# Patient Record
Sex: MALE | Race: WHITE | NOT HISPANIC OR LATINO | Employment: FULL TIME | URBAN - NONMETROPOLITAN AREA
[De-identification: names, ages, dates, MRNs, and addresses within clinical notes are randomized per-mention and may not be internally consistent; named-entity substitution may affect disease eponyms.]

---

## 2018-09-23 ENCOUNTER — OFFICE VISIT (OUTPATIENT)
Dept: URGENT CARE | Facility: PHYSICIAN GROUP | Age: 48
End: 2018-09-23
Payer: COMMERCIAL

## 2018-09-23 VITALS
RESPIRATION RATE: 16 BRPM | HEART RATE: 82 BPM | SYSTOLIC BLOOD PRESSURE: 130 MMHG | BODY MASS INDEX: 29.22 KG/M2 | TEMPERATURE: 98.9 F | DIASTOLIC BLOOD PRESSURE: 80 MMHG | OXYGEN SATURATION: 97 % | WEIGHT: 186.2 LBS | HEIGHT: 67 IN

## 2018-09-23 DIAGNOSIS — J01.90 ACUTE BACTERIAL SINUSITIS: ICD-10-CM

## 2018-09-23 DIAGNOSIS — B96.89 ACUTE BACTERIAL SINUSITIS: ICD-10-CM

## 2018-09-23 DIAGNOSIS — J02.9 SORETHROAT: ICD-10-CM

## 2018-09-23 LAB
INT CON NEG: NEGATIVE
INT CON POS: POSITIVE
S PYO AG THROAT QL: NEGATIVE

## 2018-09-23 PROCEDURE — 87880 STREP A ASSAY W/OPTIC: CPT | Performed by: PHYSICIAN ASSISTANT

## 2018-09-23 PROCEDURE — 99204 OFFICE O/P NEW MOD 45 MIN: CPT | Performed by: PHYSICIAN ASSISTANT

## 2018-09-23 RX ORDER — AMOXICILLIN AND CLAVULANATE POTASSIUM 875; 125 MG/1; MG/1
1 TABLET, FILM COATED ORAL 2 TIMES DAILY
Qty: 20 TAB | Refills: 0 | Status: SHIPPED | OUTPATIENT
Start: 2018-09-23 | End: 2018-10-03

## 2018-09-23 NOTE — PROGRESS NOTES
Chief Complaint   Patient presents with   • Pharyngitis     was treated strep 2 weeks ago       HISTORY OF PRESENT ILLNESS: Patient is a 48 y.o. male who presents today because he has a 1-2 week history of sore throat, also has some right-sided nasal and sinus pain, pressure, drainage and congestion.  He has put on a course of amoxicillin for strep throat a few weeks ago which did help with his symptoms initially but they returned shortly afterwards.  He has been using some over-the-counter medications without improvement    There are no active problems to display for this patient.      Allergies:Patient has no known allergies.    Current Outpatient Prescriptions Ordered in [facility]   Medication Sig Dispense Refill   • amoxicillin-clavulanate (AUGMENTIN) 875-125 MG Tab Take 1 Tab by mouth 2 times a day for 10 days. 20 Tab 0   • Testosterone (ANDROGEL TD) Apply  to skin as directed.       • oxycodone-acetaminophen (PERCOCET) 5-325 MG TABS Take 1-2 Tabs by mouth every 6 hours as needed for Mild Pain. 20 Each 0   • atorvastatin (LIPITOR) 20 MG TABS Take 1 Tab by mouth every bedtime. 90 Tab 0     No current Epic-ordered facility-administered medications on file.        No past medical history on file.    Social History   Substance Use Topics   • Smoking status: Never Smoker   • Smokeless tobacco: Former User   • Alcohol use Yes      Comment: occasional       Family Status   Relation Status   • Mo    • Fa      Family History   Problem Relation Age of Onset   • Heart Disease Mother        ROS:  Review of Systems   Constitutional: Negative for fever, chills, weight loss and malaise/fatigue.   HENT: Negative for ear pain, nosebleeds, positive for nasal and sinus congestion, sore throat and no neck pain.    Eyes: Negative for blurred vision.   Respiratory: Negative for cough, sputum production, shortness of breath and wheezing.    Cardiovascular: Negative for chest pain, palpitations, orthopnea and leg swelling.  "  Gastrointestinal: Negative for heartburn, nausea, vomiting and abdominal pain.   Genitourinary: Negative for dysuria, urgency and frequency.     Exam:  Blood pressure 130/80, pulse 82, temperature 37.2 °C (98.9 °F), temperature source Temporal, resp. rate 16, height 1.702 m (5' 7\"), weight 84.5 kg (186 lb 3.2 oz), SpO2 97 %.  General:  Well nourished, well developed male in NAD  Head:Normocephalic, atraumatic  Eyes: PERRLA, EOM within normal limits, no conjunctival injection, no scleral icterus, visual fields and acuity grossly intact.  Ears: Normal shape and symmetry, no tenderness, no discharge. External canals are without any significant edema or erythema. Tympanic membranes are without any inflammation, no effusion. Gross auditory acuity is intact  Nose: Symmetrical without tenderness, no discharge.  Nasal mucosa on the right is erythematous, excoriated and edematous, there is posterior nasal cavity exudate  Mouth: reasonable hygiene, no erythema exudates or tonsillar enlargement.  Neck: no masses, range of motion within normal limits, no tracheal deviation. No obvious thyroid enlargement.  Pulmonary: chest is symmetrical with respiration, no wheezes, crackles, or rhonchi.  Cardiovascular: regular rate and rhythm without murmurs, rubs, or gallops.  Extremities: no clubbing, cyanosis, or edema.    Please note that this dictation was created using voice recognition software. I have made every reasonable attempt to correct obvious errors, but I expect that there are errors of grammar and possibly content that I did not discover before finalizing the note.    Assessment/Plan:  1. Acute bacterial sinusitis  amoxicillin-clavulanate (AUGMENTIN) 875-125 MG Tab   2. Sorethroat  POCT Rapid Strep A   Tylenol or ibuprofen and decongestants as tolerated.    Followup with primary care in the next 7-10 days if not significantly improving, return to the urgent care or go to the emergency room sooner for any worsening of " symptoms.

## 2021-06-09 ENCOUNTER — OFFICE VISIT (OUTPATIENT)
Dept: URGENT CARE | Facility: PHYSICIAN GROUP | Age: 51
End: 2021-06-09
Payer: COMMERCIAL

## 2021-06-09 VITALS
TEMPERATURE: 97.7 F | OXYGEN SATURATION: 98 % | BODY MASS INDEX: 30.13 KG/M2 | DIASTOLIC BLOOD PRESSURE: 88 MMHG | WEIGHT: 192 LBS | SYSTOLIC BLOOD PRESSURE: 124 MMHG | RESPIRATION RATE: 12 BRPM | HEART RATE: 55 BPM | HEIGHT: 67 IN

## 2021-06-09 DIAGNOSIS — J32.9 RHINOSINUSITIS: ICD-10-CM

## 2021-06-09 DIAGNOSIS — H69.93 DYSFUNCTION OF BOTH EUSTACHIAN TUBES: ICD-10-CM

## 2021-06-09 PROCEDURE — 99214 OFFICE O/P EST MOD 30 MIN: CPT | Performed by: PHYSICIAN ASSISTANT

## 2021-06-09 RX ORDER — AMOXICILLIN AND CLAVULANATE POTASSIUM 875; 125 MG/1; MG/1
1 TABLET, FILM COATED ORAL 2 TIMES DAILY
Qty: 14 TABLET | Refills: 0 | Status: SHIPPED | OUTPATIENT
Start: 2021-06-09 | End: 2021-06-16

## 2021-06-09 RX ORDER — TRIAMCINOLONE ACETONIDE 40 MG/ML
40 INJECTION, SUSPENSION INTRA-ARTICULAR; INTRAMUSCULAR ONCE
Status: COMPLETED | OUTPATIENT
Start: 2021-06-09 | End: 2021-06-09

## 2021-06-09 RX ADMIN — TRIAMCINOLONE ACETONIDE 40 MG: 40 INJECTION, SUSPENSION INTRA-ARTICULAR; INTRAMUSCULAR at 09:28

## 2021-06-09 NOTE — PROGRESS NOTES
"Chief Complaint   Patient presents with   • Sinus Problem     headache and pressure, x2week    • Ear Pain     R ear pain        HISTORY OF PRESENT ILLNESS: Patient is a 50 y.o. male who presents today for the following:    Sinus pressure/HA x 2 weeks  No nasal drainage  Right ear pain  Denies cough, SOB, fever, body aches/chills  Uses NetiPot daily  Suffers from seasonal allergies    There are no problems to display for this patient.      Allergies:Patient has no known allergies.    Current Outpatient Medications Ordered in Epic   Medication Sig Dispense Refill   • amoxicillin-clavulanate (AUGMENTIN) 875-125 MG Tab Take 1 tablet by mouth 2 times a day for 7 days. 14 tablet 0     Current Facility-Administered Medications Ordered in Epic   Medication Dose Route Frequency Provider Last Rate Last Admin   • triamcinolone acetonide (KENALOG-40) injection 40 mg  40 mg Intramuscular Once DOLORES BriggsARONIT           History reviewed. No pertinent past medical history.    Social History     Tobacco Use   • Smoking status: Never Smoker   • Smokeless tobacco: Former User   Substance Use Topics   • Alcohol use: Yes     Comment: occasional   • Drug use: No       Family Status   Relation Name Status   • Mo     • Fa       Family History   Problem Relation Age of Onset   • Heart Disease Mother        Review of Systems:   Constitutional ROS: No unexpected change in weight  Pulmonary ROS: No chronic cough, sputum, or hemoptysis, No dyspnea on exertion, No wheezing  Cardiovascular ROS: No diaphoresis, No edema, No palpitations  Hematologic/Lymphatic ROS: No chills, No night sweats, No weight loss  Skin/Integumentary ROS: No edema, No evidence of rash, No itching      Exam:  /88   Pulse (!) 55   Temp 36.5 °C (97.7 °F) (Temporal)   Resp 12   Ht 1.702 m (5' 7\")   Wt 87.1 kg (192 lb)   SpO2 98%   General: Well developed, well nourished. No distress.    Eye: PERRL/EOMI; conjunctivae clear, lids " normal.  ENMT: Lips without lesions, MMM. Oropharynx is clear. Bilateral TMs are within normal limits but with serous effusions and bulging bilaterally.  Pulmonary: Unlabored respiratory effort. Lungs clear to auscultation, no wheezes, no rhonchi.    Cardiovascular: Regular rate and rhythm without murmur.   Neurologic: Grossly nonfocal. No facial asymmetry noted.  Lymph: No cervical lymphadenopathy noted.  Skin: Warm, dry, good turgor. No rashes in visible areas.   Psych: Normal mood. Alert and oriented to person, place and time.    Kenalog 40mg given in clinic    Assessment/Plan:  Discussed likely due to seasonal allergies.  Low suspicion for infection. Contingent antibiotic prescription given to patient to fill upon meeting criteria of guidelines discussed. Discussed appropriate over-the-counter symptomatic medication, and when to return to clinic. Follow up for worsening or persistent symptoms.  1. Rhinosinusitis  triamcinolone acetonide (KENALOG-40) injection 40 mg    amoxicillin-clavulanate (AUGMENTIN) 875-125 MG Tab   2. Dysfunction of both eustachian tubes

## 2021-06-16 ENCOUNTER — OFFICE VISIT (OUTPATIENT)
Dept: MEDICAL GROUP | Facility: PHYSICIAN GROUP | Age: 51
End: 2021-06-16
Payer: COMMERCIAL

## 2021-06-16 VITALS
DIASTOLIC BLOOD PRESSURE: 78 MMHG | SYSTOLIC BLOOD PRESSURE: 110 MMHG | HEIGHT: 67 IN | WEIGHT: 189 LBS | HEART RATE: 87 BPM | BODY MASS INDEX: 29.66 KG/M2 | OXYGEN SATURATION: 97 % | TEMPERATURE: 98.7 F

## 2021-06-16 DIAGNOSIS — Z76.89 ENCOUNTER TO ESTABLISH CARE: ICD-10-CM

## 2021-06-16 DIAGNOSIS — R53.83 FATIGUE, UNSPECIFIED TYPE: ICD-10-CM

## 2021-06-16 DIAGNOSIS — N40.0 ENLARGED PROSTATE: ICD-10-CM

## 2021-06-16 DIAGNOSIS — Z13.6 SCREENING FOR CARDIOVASCULAR CONDITION: ICD-10-CM

## 2021-06-16 DIAGNOSIS — J30.2 SEASONAL ALLERGIES: ICD-10-CM

## 2021-06-16 DIAGNOSIS — Z12.5 PROSTATE CANCER SCREENING: ICD-10-CM

## 2021-06-16 PROCEDURE — 99213 OFFICE O/P EST LOW 20 MIN: CPT | Performed by: NURSE PRACTITIONER

## 2021-06-16 ASSESSMENT — PATIENT HEALTH QUESTIONNAIRE - PHQ9: CLINICAL INTERPRETATION OF PHQ2 SCORE: 0

## 2021-06-16 NOTE — PROGRESS NOTES
Chief Complaint   Patient presents with   • Establish Care       Subjective:     HPI:   Alphonso Escobedo is a 50 y.o. male here to discuss the evaluation and management of:        Enlarged prostate  This is a chronic problem newly being assessed today.  Patient indicates that he has a history of enlarged prostate and does get up to pee at night approximately 4 times.  He denies any weakened stream.  He does have increased urination over the past 2 weeks.  Denies pain with urination, nausea, or back pain.    Fatigue  This is a chronic condition that is newly being assessed today.  Patient reports that his fatigue is worse in the afternoons and he does drink a lot of coffee to help with it.   He indicates that testosterone injections in the past have helped and is requesting testosterone levels be drawn today.  I do feel this is reasonable.  Labs have been ordered.      Seasonal allergies  This is a chronic conditions newly being assessed today.  Patient reports that he was recently seen in the urgent care for rhinosinusitis and seasonal allergy exacerbation.  He is currently taking Augmentin and did obtain a Kenalog shot during his urgent care appointment.  He is feeling better however he still states he is quite fatigued, has muscle achy pain in his chest, and neck.   He denies fever, headache, sinus pain, sore throat, ear pain, nausea, vomiting.  Patient did ask if he could have had Covid.  I educated him on the vague symptoms of Covid and that it is possible however testing at this point would not be appropriate as it has been 3 weeks since onset of symptoms.  I did encourage him to get the Covid vaccination.  Discussed good hand hygiene and to wear a mask in public due to not being vaccinated and slight possibility of jammie Covid.           ROS:  Gen: Positive per HPI for generalized muscle aches.  Eyes: no changes in vision  ENT: no sore throat, no bloody nose  Pulm: no sob, no cough  CV: no chest  "pain, no palpitations  GI: no nausea/vomiting, no diarrhea  : Positive per HPI for frequent nocturia  Skin: no rash  Neuro: no headaches,       No Known Allergies    Current medicines (including changes today)  Current Outpatient Medications   Medication Sig Dispense Refill   • amoxicillin-clavulanate (AUGMENTIN) 875-125 MG Tab Take 1 tablet by mouth 2 times a day for 7 days. 14 tablet 0     No current facility-administered medications for this visit.       Social History     Tobacco Use   • Smoking status: Never Smoker   • Smokeless tobacco: Former User   Substance Use Topics   • Alcohol use: Yes     Comment: occasional   • Drug use: No       Patient Active Problem List    Diagnosis Date Noted   • Fatigue 06/16/2021   • Enlarged prostate 06/16/2021   • Seasonal allergies 06/16/2021       Family History   Problem Relation Age of Onset   • Heart Disease Mother           Objective:     /78   Pulse 87   Temp 37.1 °C (98.7 °F)   Ht 1.702 m (5' 7\")   Wt 85.7 kg (189 lb)   SpO2 97%  Body mass index is 29.6 kg/m².    Physical Exam:  Constitutional: Well-developed and well-nourished male in NAD. Not diaphoretic. No distress.   Skin: warm, dry, intact, no evidence of rash or concerning lesions  Head: Atraumatic without lesions.  Eyes: Conjunctivae and extraocular motions are normal. Pupils are equal, round, and reactive to light. No scleral icterus.   Ears:  External ears unremarkable. TMs normal; bilaterally  Mouth/Throat: Tongue normal. Oropharynx is clear and moist. Posterior pharynx without erythema or exudates.  Neck: Supple, trachea midline. No thyromegaly present. No cervical or supraclavicular lymphadenopathy.  Cardiovascular: Regular rate and rhythm without murmur. Radial pulses are intact and equal bilaterally.  Pulmonary: Clear to ausculation. Normal effort. No rales, ronchi, or wheezing.  Abdomen: Soft, non tender, and without distention. Active bowel sounds in all four quadrants.   Extremities: No " cyanosis, clubbing, erythema, nor edema.   Neurological: Alert and oriented x 3. No cranial nerve deficit. 5/5 myotomes. Sensation intact.   Psychiatric:  Behavior, mood, and affect are appropriate.       Assessment and Plan:     The following treatment plan was discussed:    1. Encounter to establish care  Patient is here today to establish care with a new primary care provider.  He states his last primary care provider was a couple years ago.  He indicates that he is due for blood work.  He has not received the Covid vaccine.  Holds history of enlarged prostate, high cholesterol, and fatigue.    2. Screening for cardiovascular condition  - Comp Metabolic Panel; Future  - Lipid Profile; Future    3. Fatigue, unspecified type  - TESTOSTERONE SERUM; Future  - CBC WITH DIFFERENTIAL; Future    4. Prostate cancer screening  - PROSTATE SPECIFIC AG SCREENING; Future    5. Enlarged prostate  PSA level was ordered today.  Discussed possible referral to urology or form surgical management.    6. Seasonal allergies  Continue course of antibiotic treatment prescribed from urgent care.  Continue using over-the-counter antihistamines and Heather pot.  Educated to continue use mask as he has not Covid vaccinated.  We will continue to monitor at future appointments.    Any change or worsening of signs or symptoms, patient encouraged to follow-up or report to emergency room for further evaluation. Patient verbalizes understanding and agrees.    Follow-Up: Return in about 4 weeks (around 7/14/2021) for Follow up labs.      PLEASE NOTE: This dictation was created using voice recognition software. I have made every reasonable attempt to correct obvious errors, but I expect that there are errors of grammar and possibly content that I did not discover before finalizing the note.

## 2021-06-16 NOTE — PATIENT INSTRUCTIONS
Fatigue  If you have fatigue, you feel tired all the time and have a lack of energy or a lack of motivation. Fatigue may make it difficult to start or complete tasks because of exhaustion. In general, occasional or mild fatigue is often a normal response to activity or life. However, long-lasting (chronic) or extreme fatigue may be a symptom of a medical condition.  Follow these instructions at home:  General instructions  · Watch your fatigue for any changes.  · Go to bed and get up at the same time every day.  · Avoid fatigue by pacing yourself during the day and getting enough sleep at night.  · Maintain a healthy weight.  Medicines  · Take over-the-counter and prescription medicines only as told by your health care provider.  · Take a multivitamin, if told by your health care provider.   · Do not use herbal or dietary supplements unless they are approved by your health care provider.  Activity    · Exercise regularly, as told by your health care provider.  · Use or practice techniques to help you relax, such as yoga, chapo chi, meditation, or massage therapy.  Eating and drinking    · Avoid heavy meals in the evening.  · Eat a well-balanced diet, which includes lean proteins, whole grains, plenty of fruits and vegetables, and low-fat dairy products.  · Avoid consuming too much caffeine.  · Avoid the use of alcohol.  · Drink enough fluid to keep your urine pale yellow.  Lifestyle  · Change situations that cause you stress. Try to keep your work and personal schedule in balance.  · Do not use any products that contain nicotine or tobacco, such as cigarettes and e-cigarettes. If you need help quitting, ask your health care provider.  · Do not use drugs.  Contact a health care provider if:  · Your fatigue does not get better.  · You have a fever.  · You suddenly lose or gain weight.  · You have headaches.  · You have trouble falling asleep or sleeping through the night.  · You feel angry, guilty, anxious, or  sad.  · You are unable to have a bowel movement (constipation).  · Your skin is dry.  · You have swelling in your legs or another part of your body.  Get help right away if:  · You feel confused.  · Your vision is blurry.  · You feel faint or you pass out.  · You have a severe headache.  · You have severe pain in your abdomen, your back, or the area between your waist and hips (pelvis).  · You have chest pain, shortness of breath, or an irregular or fast heartbeat.  · You are unable to urinate, or you urinate less than normal.  · You have abnormal bleeding, such as bleeding from the rectum, vagina, nose, lungs, or nipples.  · You vomit blood.  · You have thoughts about hurting yourself or others.  If you ever feel like you may hurt yourself or others, or have thoughts about taking your own life, get help right away. You can go to your nearest emergency department or call:  · Your local emergency services (911 in the [place]  · A suicide crisis helpline, such as the National Suicide Prevention Lifeline at 1-252.805.2349. This is open 24 hours a day.  Summary  · If you have fatigue, you feel tired all the time and have a lack of energy or a lack of motivation.  · Fatigue may make it difficult to start or complete tasks because of exhaustion.  · Long-lasting (chronic) or extreme fatigue may be a symptom of a medical condition.  · Exercise regularly, as told by your health care provider.  · Change situations that cause you stress. Try to keep your work and personal schedule in balance.  This information is not intended to replace advice given to you by your health care provider. Make sure you discuss any questions you have with your health care provider.  Document Released: 10/14/2008 Document Revised: 04/09/2020 Document Reviewed: 09/12/2018  Elsevier Patient Education © 2020 [employer]

## 2021-06-16 NOTE — ASSESSMENT & PLAN NOTE
This is a chronic conditions newly being assessed today.  Patient reports that he was recently seen in the urgent care for rhinosinusitis and seasonal allergy exacerbation.  He is currently taking Augmentin and did obtain a Kenalog shot during his urgent care appointment.  He is feeling better however he still states he is quite fatigued, has muscle achy pain in his chest, and neck.   He denies fever, headache, sinus pain, sore throat, ear pain, nausea, vomiting.  Patient did ask if he could have had Covid.  I educated him on the vague symptoms of Covid and that it is possible however testing at this point would not be appropriate as it has been 3 weeks since onset of symptoms.  I did encourage him to get the Covid vaccination.  Discussed good hand hygiene and to wear a mask in public due to not being vaccinated and slight possibility of jammie Covid.

## 2021-06-16 NOTE — ASSESSMENT & PLAN NOTE
This is a chronic condition that is newly being assessed today.  Patient reports that his fatigue is worse in the afternoons and he does drink a lot of coffee to help with it.   He indicates that testosterone injections in the past have helped and is requesting testosterone levels be drawn today.  I do feel this is reasonable.  Labs have been ordered.

## 2021-06-16 NOTE — ASSESSMENT & PLAN NOTE
This is a chronic problem newly being assessed today.  Patient indicates that he has a history of enlarged prostate and does get up to pee at night approximately 4 times.  He denies any weakened stream.  He does have increased urination over the past 2 weeks.  Denies pain with urination, nausea, or back pain.

## 2021-06-24 ENCOUNTER — HOSPITAL ENCOUNTER (OUTPATIENT)
Dept: LAB | Facility: MEDICAL CENTER | Age: 51
End: 2021-06-24
Attending: NURSE PRACTITIONER
Payer: COMMERCIAL

## 2021-06-24 DIAGNOSIS — R53.83 FATIGUE, UNSPECIFIED TYPE: ICD-10-CM

## 2021-06-24 DIAGNOSIS — Z12.5 PROSTATE CANCER SCREENING: ICD-10-CM

## 2021-06-24 DIAGNOSIS — Z13.6 SCREENING FOR CARDIOVASCULAR CONDITION: ICD-10-CM

## 2021-06-24 PROCEDURE — 84403 ASSAY OF TOTAL TESTOSTERONE: CPT

## 2021-06-24 PROCEDURE — 80061 LIPID PANEL: CPT

## 2021-06-24 PROCEDURE — 84153 ASSAY OF PSA TOTAL: CPT

## 2021-06-24 PROCEDURE — 36415 COLL VENOUS BLD VENIPUNCTURE: CPT

## 2021-06-24 PROCEDURE — 85025 COMPLETE CBC W/AUTO DIFF WBC: CPT

## 2021-06-24 PROCEDURE — 80053 COMPREHEN METABOLIC PANEL: CPT

## 2021-06-25 LAB
ALBUMIN SERPL BCP-MCNC: 4.7 G/DL (ref 3.2–4.9)
ALBUMIN/GLOB SERPL: 1.9 G/DL
ALP SERPL-CCNC: 55 U/L (ref 30–99)
ALT SERPL-CCNC: 41 U/L (ref 2–50)
ANION GAP SERPL CALC-SCNC: 12 MMOL/L (ref 7–16)
AST SERPL-CCNC: 30 U/L (ref 12–45)
BASOPHILS # BLD AUTO: 0.4 % (ref 0–1.8)
BASOPHILS # BLD: 0.03 K/UL (ref 0–0.12)
BILIRUB SERPL-MCNC: 0.8 MG/DL (ref 0.1–1.5)
BUN SERPL-MCNC: 16 MG/DL (ref 8–22)
CALCIUM SERPL-MCNC: 9.5 MG/DL (ref 8.5–10.5)
CHLORIDE SERPL-SCNC: 107 MMOL/L (ref 96–112)
CHOLEST SERPL-MCNC: 270 MG/DL (ref 100–199)
CO2 SERPL-SCNC: 26 MMOL/L (ref 20–33)
CREAT SERPL-MCNC: 0.95 MG/DL (ref 0.5–1.4)
EOSINOPHIL # BLD AUTO: 0.09 K/UL (ref 0–0.51)
EOSINOPHIL NFR BLD: 1.2 % (ref 0–6.9)
ERYTHROCYTE [DISTWIDTH] IN BLOOD BY AUTOMATED COUNT: 43.4 FL (ref 35.9–50)
FASTING STATUS PATIENT QL REPORTED: NORMAL
GLOBULIN SER CALC-MCNC: 2.5 G/DL (ref 1.9–3.5)
GLUCOSE SERPL-MCNC: 94 MG/DL (ref 65–99)
HCT VFR BLD AUTO: 48.2 % (ref 42–52)
HDLC SERPL-MCNC: 37 MG/DL
HGB BLD-MCNC: 15.6 G/DL (ref 14–18)
IMM GRANULOCYTES # BLD AUTO: 0.03 K/UL (ref 0–0.11)
IMM GRANULOCYTES NFR BLD AUTO: 0.4 % (ref 0–0.9)
LDLC SERPL CALC-MCNC: 192 MG/DL
LYMPHOCYTES # BLD AUTO: 2.71 K/UL (ref 1–4.8)
LYMPHOCYTES NFR BLD: 35.3 % (ref 22–41)
MCH RBC QN AUTO: 31.1 PG (ref 27–33)
MCHC RBC AUTO-ENTMCNC: 32.4 G/DL (ref 33.7–35.3)
MCV RBC AUTO: 96.2 FL (ref 81.4–97.8)
MONOCYTES # BLD AUTO: 0.76 K/UL (ref 0–0.85)
MONOCYTES NFR BLD AUTO: 9.9 % (ref 0–13.4)
NEUTROPHILS # BLD AUTO: 4.06 K/UL (ref 1.82–7.42)
NEUTROPHILS NFR BLD: 52.8 % (ref 44–72)
NRBC # BLD AUTO: 0 K/UL
NRBC BLD-RTO: 0 /100 WBC
PLATELET # BLD AUTO: 200 K/UL (ref 164–446)
PMV BLD AUTO: 10.9 FL (ref 9–12.9)
POTASSIUM SERPL-SCNC: 5 MMOL/L (ref 3.6–5.5)
PROT SERPL-MCNC: 7.2 G/DL (ref 6–8.2)
PSA SERPL-MCNC: 0.98 NG/ML (ref 0–4)
RBC # BLD AUTO: 5.01 M/UL (ref 4.7–6.1)
SODIUM SERPL-SCNC: 145 MMOL/L (ref 135–145)
TESTOST SERPL-MCNC: 203 NG/DL (ref 175–781)
TRIGL SERPL-MCNC: 203 MG/DL (ref 0–149)
WBC # BLD AUTO: 7.7 K/UL (ref 4.8–10.8)

## 2021-07-13 ENCOUNTER — OFFICE VISIT (OUTPATIENT)
Dept: MEDICAL GROUP | Facility: PHYSICIAN GROUP | Age: 51
End: 2021-07-13
Payer: COMMERCIAL

## 2021-07-13 VITALS
HEIGHT: 67 IN | TEMPERATURE: 98.4 F | HEART RATE: 72 BPM | BODY MASS INDEX: 28.88 KG/M2 | SYSTOLIC BLOOD PRESSURE: 112 MMHG | WEIGHT: 184 LBS | OXYGEN SATURATION: 98 % | RESPIRATION RATE: 16 BRPM | DIASTOLIC BLOOD PRESSURE: 70 MMHG

## 2021-07-13 DIAGNOSIS — R53.83 FATIGUE, UNSPECIFIED TYPE: ICD-10-CM

## 2021-07-13 DIAGNOSIS — E78.2 MIXED DYSLIPIDEMIA: ICD-10-CM

## 2021-07-13 DIAGNOSIS — J30.2 SEASONAL ALLERGIES: ICD-10-CM

## 2021-07-13 PROCEDURE — 99214 OFFICE O/P EST MOD 30 MIN: CPT | Performed by: NURSE PRACTITIONER

## 2021-07-13 RX ORDER — ALBUTEROL SULFATE 90 UG/1
2 AEROSOL, METERED RESPIRATORY (INHALATION) EVERY 6 HOURS PRN
Qty: 8 G | Refills: 2 | Status: SHIPPED | OUTPATIENT
Start: 2021-07-13 | End: 2023-07-12 | Stop reason: SDUPTHER

## 2021-07-13 ASSESSMENT — FIBROSIS 4 INDEX: FIB4 SCORE: 1.17

## 2021-07-13 NOTE — PATIENT INSTRUCTIONS
"High Cholesterol    High cholesterol is a condition in which the blood has high levels of a white, waxy, fat-like substance (cholesterol). The human body needs small amounts of cholesterol. The liver makes all the cholesterol that the body needs. Extra (excess) cholesterol comes from the food that we eat.  Cholesterol is carried from the liver by the blood through the blood vessels. If you have high cholesterol, deposits (plaques) may build up on the walls of your blood vessels (arteries). Plaques make the arteries narrower and stiffer. Cholesterol plaques increase your risk for heart attack and stroke. Work with your health care provider to keep your cholesterol levels in a healthy range.  What increases the risk?  This condition is more likely to develop in people who:  · Eat foods that are high in animal fat (saturated fat) or cholesterol.  · Are overweight.  · Are not getting enough exercise.  · Have a family history of high cholesterol.  What are the signs or symptoms?  There are no symptoms of this condition.  How is this diagnosed?  This condition may be diagnosed from the results of a blood test.  · If you are older than age 20, your health care provider may check your cholesterol every 4-6 years.  · You may be checked more often if you already have high cholesterol or other risk factors for heart disease.  The blood test for cholesterol measures:  · \"Bad\" cholesterol (LDL cholesterol). This is the main type of cholesterol that causes heart disease. The desired level for LDL is less than 100.  · \"Good\" cholesterol (HDL cholesterol). This type helps to protect against heart disease by cleaning the arteries and carrying the LDL away. The desired level for HDL is 60 or higher.  · Triglycerides. These are fats that the body can store or burn for energy. The desired number for triglycerides is lower than 150.  · Total cholesterol. This is a measure of the total amount of cholesterol in your blood, including LDL " cholesterol, HDL cholesterol, and triglycerides. A healthy number is less than 200.  How is this treated?  This condition is treated with diet changes, lifestyle changes, and medicines.  Diet changes  · This may include eating more whole grains, fruits, vegetables, nuts, and fish.  · This may also include cutting back on red meat and foods that have a lot of added sugar.  Lifestyle changes  · Changes may include getting at least 40 minutes of aerobic exercise 3 times a week. Aerobic exercises include walking, biking, and swimming. Aerobic exercise along with a healthy diet can help you maintain a healthy weight.  · Changes may also include quitting smoking.  Medicines  · Medicines are usually given if diet and lifestyle changes have failed to reduce your cholesterol to healthy levels.  · Your health care provider may prescribe a statin medicine. Statin medicines have been shown to reduce cholesterol, which can reduce the risk of heart disease.  Follow these instructions at home:  Eating and drinking  If told by your health care provider:  · Eat chicken (without skin), fish, veal, shellfish, ground turkey breast, and round or loin cuts of red meat.  · Do not eat fried foods or fatty meats, such as hot dogs and salami.  · Eat plenty of fruits, such as apples.  · Eat plenty of vegetables, such as broccoli, potatoes, and carrots.  · Eat beans, peas, and lentils.  · Eat grains such as barley, rice, couscous, and bulgur wheat.  · Eat pasta without cream sauces.  · Use skim or nonfat milk, and eat low-fat or nonfat yogurt and cheeses.  · Do not eat or drink whole milk, cream, ice cream, egg yolks, or hard cheeses.  · Do not eat stick margarine or tub margarines that contain trans fats (also called partially hydrogenated oils).  · Do not eat saturated tropical oils, such as coconut oil and palm oil.  · Do not eat cakes, cookies, crackers, or other baked goods that contain trans fats.    General instructions  · Exercise as  directed by your health care provider. Increase your activity level with activities such as gardening, walking, and taking the stairs.  · Take over-the-counter and prescription medicines only as told by your health care provider.  · Do not use any products that contain nicotine or tobacco, such as cigarettes and e-cigarettes. If you need help quitting, ask your health care provider.  · Keep all follow-up visits as told by your health care provider. This is important.  Contact a health care provider if:  · You are struggling to maintain a healthy diet or weight.  · You need help to start on an exercise program.  · You need help to stop smoking.  Get help right away if:  · You have chest pain.  · You have trouble breathing.  This information is not intended to replace advice given to you by your health care provider. Make sure you discuss any questions you have with your health care provider.  Document Released: 12/18/2006 Document Revised: 12/21/2018 Document Reviewed: 06/17/2017  [employer] Patient Education © 2020 [employer]      Preventing High Cholesterol  Cholesterol is a white, waxy substance similar to fat that the human body needs to help build cells. The liver makes all the cholesterol that a person's body needs. Having high cholesterol (hypercholesterolemia) increases a person's risk for heart disease and stroke. Extra (excess) cholesterol comes from the food the person eats.  High cholesterol can often be prevented with diet and lifestyle changes. If you already have high cholesterol, you can control it with diet and lifestyle changes and with medicine.  How can high cholesterol affect me?  If you have high cholesterol, deposits (plaques) may build up on the walls of your arteries. The arteries are the blood vessels that carry blood away from your heart.  Plaques make the arteries narrower and stiffer. This can limit or block blood flow and cause blood clots to form. Blood clots:  · Are tiny balls of  cells that form in your blood.  · Can move to the heart or brain, causing a heart attack or stroke.  Plaques in arteries greatly increase your risk for heart attack and stroke.Making diet and lifestyle changes can reduce your risk for these conditions that may threaten your life.  What can increase my risk?  This condition is more likely to develop in people who:  · Eat foods that are high in saturated fat or cholesterol. Saturated fat is mostly found in:  ? Foods that contain animal fat, such as red meat and some dairy products.  ? Certain fatty foods made from plants, such as tropical oils.  · Are overweight.  · Are not getting enough exercise.  · Have a family history of high cholesterol.  What actions can I take to prevent this?  Nutrition    · Eat less saturated fat.  · Avoid trans fats (partially hydrogenated oils). These are often found in margarine and in some baked goods, fried foods, and snacks bought in packages.  · Avoid precooked or cured meat, such as sausages or meat loaves.  · Avoid foods and drinks that have added sugars.  · Eat more fruits, vegetables, and whole grains.  · Choose healthy sources of protein, such as fish, poultry, lean cuts of red meat, beans, peas, lentils, and nuts.  · Choose healthy sources of fat, such as:  ? Nuts.  ? Vegetable oils, especially olive oil.  ? Fish that have healthy fats (omega-3 fatty acids), such as mackerel or salmon.  The items listed above may not be a complete list of recommended foods and beverages. Contact a dietitian for more information.  Lifestyle  · Lose weight if you are overweight. Losing 5-10 lb (2.3-4.5 kg) can help prevent or control high cholesterol. It can also lower your risk for diabetes and high blood pressure. Ask your health care provider to help you with a diet and exercise plan to lose weight safely.  · Do not use any products that contain nicotine or tobacco, such as cigarettes, e-cigarettes, and chewing tobacco. If you need help  quitting, ask your health care provider.  · Limit your alcohol intake.  ? Do not drink alcohol if:  § Your health care provider tells you not to drink.  § You are pregnant, may be pregnant, or are planning to become pregnant.  ? If you drink alcohol:  § Limit how much you use to:  § 0-1 drink a day for women.  § 0-2 drinks a day for men.  § Be aware of how much alcohol is in your drink. In the [place] one drink equals one 12 oz bottle of beer (355 mL), one 5 oz glass of wine (148 mL), or one 1½ oz glass of hard liquor (44 mL).  Activity    · Get enough exercise. Each week, do at least 150 minutes of exercise that takes a medium level of effort (moderate-intensity exercise).  ? This is exercise that:  § Makes your heart beat faster and makes you breathe harder than usual.  § Allows you to still be able to talk.  ? You could exercise in short sessions several times a day or longer sessions a few times a week. For example, on 5 days each week, you could walk fast or ride your bike 3 times a day for 10 minutes each time.  · Do exercises as told by your health care provider.  Medicines  · In addition to diet and lifestyle changes, your health care provider may recommend medicines to help lower cholesterol. This may be a medicine to lower the amount of cholesterol your liver makes. You may need medicine if:  ? Diet and lifestyle changes do not lower your cholesterol enough.  ? You have high cholesterol and other risk factors for heart disease or stroke.  · Take over-the-counter and prescription medicines only as told by your health care provider.  General information  · Manage your risk factors for high cholesterol. Talk with your health care provider about all your risk factors and how to lower your risk.  · Manage other conditions that you have, such as diabetes or high blood pressure (hypertension).  · Have blood tests to check your cholesterol levels at regular points in time as told by your health care  provider.  · Keep all follow-up visits as told by your health care provider. This is important.  Where to find more information  · American Heart Association: www.heart.org  · National Heart, Lung, and Blood [place]: www.nhlbi.nih.gov  Summary  · High cholesterol increases your risk for heart disease and stroke. By keeping your cholesterol level low, you can reduce your risk for these conditions.  · High cholesterol can often be prevented with diet and lifestyle changes.  · Work with your health care provider to manage your risk factors, and have your blood tested regularly.  This information is not intended to replace advice given to you by your health care provider. Make sure you discuss any questions you have with your health care provider.  Document Released: 01/01/2017 Document Revised: 04/10/2020 Document Reviewed: 08/26/2017  [employer] Patient Education © 2020 [employer]

## 2021-07-13 NOTE — ASSESSMENT & PLAN NOTE
The 10-year ASCVD risk score (Leo TOBIN Jr., et al., 2013) is: 5.7%    Values used to calculate the score:      Age: 51 years      Sex: Male      Is Non- : No      Diabetic: No      Tobacco smoker: No      Systolic Blood Pressure: 112 mmHg      Is BP treated: No      HDL Cholesterol: 37 mg/dL      Total Cholesterol: 270 mg/dL    Reviewed labs with patient and dicussed diet and health lifestyle modification to improve his cholesterol levels.   He reports strong family history of elevated cholesterol that may be genetic related.   Dicussed referral to Lipoid clinic however he has declined at this time.
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
## 2022-03-08 ENCOUNTER — HOSPITAL ENCOUNTER (OUTPATIENT)
Dept: LAB | Facility: MEDICAL CENTER | Age: 52
End: 2022-03-08
Attending: NURSE PRACTITIONER
Payer: COMMERCIAL

## 2022-03-08 DIAGNOSIS — E78.2 MIXED DYSLIPIDEMIA: ICD-10-CM

## 2022-03-08 LAB
ALBUMIN SERPL BCP-MCNC: 4.7 G/DL (ref 3.2–4.9)
ALBUMIN/GLOB SERPL: 2 G/DL
ALP SERPL-CCNC: 71 U/L (ref 30–99)
ALT SERPL-CCNC: 59 U/L (ref 2–50)
ANION GAP SERPL CALC-SCNC: 11 MMOL/L (ref 7–16)
AST SERPL-CCNC: 34 U/L (ref 12–45)
BILIRUB SERPL-MCNC: 0.8 MG/DL (ref 0.1–1.5)
BUN SERPL-MCNC: 15 MG/DL (ref 8–22)
CALCIUM SERPL-MCNC: 9.9 MG/DL (ref 8.5–10.5)
CHLORIDE SERPL-SCNC: 103 MMOL/L (ref 96–112)
CHOLEST SERPL-MCNC: 266 MG/DL (ref 100–199)
CO2 SERPL-SCNC: 25 MMOL/L (ref 20–33)
CREAT SERPL-MCNC: 0.82 MG/DL (ref 0.5–1.4)
GLOBULIN SER CALC-MCNC: 2.3 G/DL (ref 1.9–3.5)
GLUCOSE SERPL-MCNC: 105 MG/DL (ref 65–99)
HDLC SERPL-MCNC: 30 MG/DL
LDLC SERPL CALC-MCNC: ABNORMAL MG/DL
POTASSIUM SERPL-SCNC: 4.4 MMOL/L (ref 3.6–5.5)
PROT SERPL-MCNC: 7 G/DL (ref 6–8.2)
SODIUM SERPL-SCNC: 139 MMOL/L (ref 135–145)
TRIGL SERPL-MCNC: 557 MG/DL (ref 0–149)

## 2022-03-08 PROCEDURE — 80053 COMPREHEN METABOLIC PANEL: CPT

## 2022-03-08 PROCEDURE — 36415 COLL VENOUS BLD VENIPUNCTURE: CPT

## 2022-03-08 PROCEDURE — 80061 LIPID PANEL: CPT

## 2022-03-16 ENCOUNTER — OFFICE VISIT (OUTPATIENT)
Dept: MEDICAL GROUP | Facility: PHYSICIAN GROUP | Age: 52
End: 2022-03-16
Payer: COMMERCIAL

## 2022-03-16 VITALS
WEIGHT: 190.8 LBS | SYSTOLIC BLOOD PRESSURE: 110 MMHG | DIASTOLIC BLOOD PRESSURE: 80 MMHG | RESPIRATION RATE: 16 BRPM | BODY MASS INDEX: 29.95 KG/M2 | OXYGEN SATURATION: 98 % | TEMPERATURE: 98.4 F | HEART RATE: 75 BPM | HEIGHT: 67 IN

## 2022-03-16 DIAGNOSIS — R73.01 IMPAIRED FASTING BLOOD SUGAR: ICD-10-CM

## 2022-03-16 DIAGNOSIS — R79.89 LOW TESTOSTERONE IN MALE: ICD-10-CM

## 2022-03-16 DIAGNOSIS — R74.01 ALT (SGPT) LEVEL RAISED: ICD-10-CM

## 2022-03-16 DIAGNOSIS — E78.2 MIXED DYSLIPIDEMIA: ICD-10-CM

## 2022-03-16 PROCEDURE — 99214 OFFICE O/P EST MOD 30 MIN: CPT | Performed by: NURSE PRACTITIONER

## 2022-03-16 ASSESSMENT — PATIENT HEALTH QUESTIONNAIRE - PHQ9: CLINICAL INTERPRETATION OF PHQ2 SCORE: 0

## 2022-03-16 ASSESSMENT — FIBROSIS 4 INDEX: FIB4 SCORE: 1.11

## 2022-03-17 NOTE — PROGRESS NOTES
Chief Complaint   Patient presents with   • Follow-Up     labs       Subjective:     HPI:   Alphonso Escobedo is a 51 y.o. male here to discuss the evaluation and management of:      Mixed dyslipidemia  The 10-year ASCVD risk score ([place] TOBIN Jr., et al., 2013) is: 6.8%    Values used to calculate the score:      Age: 51 years      Sex: Male      Is Non- : No      Diabetic: No      Tobacco smoker: No      Systolic Blood Pressure: 110 mmHg      Is BP treated: No      HDL Cholesterol: 30 mg/dL      Total Cholesterol: 266 mg/dL    Reviewed labs patient answered all questions.  Patient continues to have elevated total cholesterol, triglycerides, and normal HDL.  He does report that he went to [place] the weekend prior to his labs being drawn which could have affected his values.    He does not currently follow a healthy diet or regular exercise.    Plan  Discussed appropriate diet lifestyle modifications.  Hands were provided in clinic.  Encourage patient to start on omega-3's.  Patient declined wanting to start any prescription medication today.  Discussed risk associated with very elevated triglyceride levels.  Discussed possible referral to lipid clinic in the future.  We will repeat labs in 3 months.          ALT (SGPT) level raised  Reviewed labs with patient indicating mildly elevated ALT level.  Patient does indicate that the weekend prior to his lab draw he was in [place] and was drinking more than normal.  He denies any right upper quadrant pain, nausea, vomiting, or yellowing of the skin.    Plan  Discussed with patient increase alcohol use and encouraged him to not drink more than cc recommended guidelines of 1-2 alcoholic beverages per day.  He does indicate he does not normally drink however he was in [facility] and was drinking more than normal.  Repeat labs in 3 months.    Impaired fasting blood sugar  This is a new condition.  Reviewed labs with patient indicating mildly elevated  fasting blood sugar.  . Patient denies any symptoms of hyperglycemia.  He was drinking alcohol weekend prior to his lab draw.    Plan  Repeat labs in 3 months.  A1c levels added.  Discussed diet lifestyle modifications.    ROS:  Gen: no fevers/chills, no changes in weight  Eyes: no changes in vision  ENT: no sore throat, no hearing loss, no bloody nose  Pulm: no sob, no cough  CV: no chest pain, no palpitations  GI: no nausea/vomiting, no diarrhea  : no dysuria  MSk: no myalgias  Skin: no rash  Neuro: no headaches, no numbness/tingling  Heme/Lymph: no easy bruising    No Known Allergies    Current medicines (including changes today)  Current Outpatient Medications   Medication Sig Dispense Refill   • albuterol 108 (90 Base) MCG/ACT Aero Soln inhalation aerosol Inhale 2 Puffs every 6 hours as needed for Shortness of Breath. 8 g 2     No current facility-administered medications for this visit.          Objective:     Hospital Outpatient Visit on 03/08/2022   Component Date Value Ref Range Status   • Cholesterol,Tot 03/08/2022 266 (A) 100 - 199 mg/dL Final   • Triglycerides 03/08/2022 557 (A) 0 - 149 mg/dL Final   • HDL 03/08/2022 30 (A) >=40 mg/dL Final   • LDL 03/08/2022 see below  <100 mg/dL Final    Comment: The calculated LDL value is invalid due to the triglyceride  value of >400 mg/dL.     • Sodium 03/08/2022 139  135 - 145 mmol/L Final   • Potassium 03/08/2022 4.4  3.6 - 5.5 mmol/L Final   • Chloride 03/08/2022 103  96 - 112 mmol/L Final   • Co2 03/08/2022 25  20 - 33 mmol/L Final   • Anion Gap 03/08/2022 11.0  7.0 - 16.0 Final   • Glucose 03/08/2022 105 (A) 65 - 99 mg/dL Final   • Bun 03/08/2022 15  8 - 22 mg/dL Final   • Creatinine 03/08/2022 0.82  0.50 - 1.40 mg/dL Final   • Calcium 03/08/2022 9.9  8.5 - 10.5 mg/dL Final   • AST(SGOT) 03/08/2022 34  12 - 45 U/L Final   • ALT(SGPT) 03/08/2022 59 (A) 2 - 50 U/L Final   • Alkaline Phosphatase 03/08/2022 71  30 - 99 U/L Final   • Total Bilirubin 03/08/2022  "0.8  0.1 - 1.5 mg/dL Final   • Albumin 03/08/2022 4.7  3.2 - 4.9 g/dL Final   • Total Protein 03/08/2022 7.0  6.0 - 8.2 g/dL Final   • Globulin 03/08/2022 2.3  1.9 - 3.5 g/dL Final   • A-G Ratio 03/08/2022 2.0  g/dL Final   • GFR If  03/08/2022 >60  >60 mL/min/1.73 m 2 Final   • GFR If Non  03/08/2022 >60  >60 mL/min/1.73 m 2 Final    Comment: Reported eGFR is based the MDRD equation. Effective 3/15/22,  the laboratory will implement the new CKD-EPI 2021 equation.  In the interim, to calculate eGFR based on the CKD-EPI:2021  equation, go to [employer] website at  https://www.kidney.org/professionals/kdoqi/gfr_calculator/formula         /80   Pulse 75   Temp 36.9 °C (98.4 °F)   Resp 16   Ht 1.702 m (5' 7\")   Wt 86.5 kg (190 lb 12.8 oz)   SpO2 98%  Body mass index is 29.88 kg/m².    Physical Exam:  Constitutional: Well-developed and well-nourished male in NAD. Not diaphoretic. No distress.   Skin: warm, dry, intact, no evidence of rash or concerning lesions  Eyes: Conjunctivae and extraocular motions are normal. Pupils are equal, round, and reactive to light. No scleral icterus.   Cardiovascular: Regular rate and rhythm without murmur. Radial pulses are intact and equal bilaterally.  Pulmonary: Clear to ausculation. Normal effort. No rales, ronchi, or wheezing.  Abdomen: Soft, non tender, and without distention. Active bowel sounds in all four quadrants.   Extremities: No cyanosis, clubbing, erythema, nor edema.   Neurological: Alert and oriented x 3.   Psychiatric:  Behavior, mood, and affect are appropriate.    Assessment and Plan:     The following treatment plan was discussed:  I have reviewed all labs with patient and answered all questions.    1. Mixed dyslipidemia  - Lipid Profile; Future    2. ALT (SGPT) level raised  - Comp Metabolic Panel; Future    3. Low testosterone in male  - TESTOSTERONE SERUM; Future    4. Impaired fasting blood sugar  - " HEMOGLOBIN A1C; Future      Any change or worsening of signs or symptoms, patient encouraged to follow-up or report to emergency room for further evaluation. Patient verbalizes understanding and agrees.    Follow-Up: Return in about 4 months (around 7/16/2022) for Follow up labs.      PLEASE NOTE: This dictation was created using voice recognition software. I have made every reasonable attempt to correct obvious errors, but I expect that there are errors of grammar and possibly content that I did not discover before finalizing the note.

## 2022-03-17 NOTE — ASSESSMENT & PLAN NOTE
Reviewed labs with patient indicating mildly elevated ALT level.  Patient does indicate that the weekend prior to his lab draw he was in [place] and was drinking more than normal.  He denies any right upper quadrant pain, nausea, vomiting, or yellowing of the skin.    Plan  Discussed with patient increase alcohol use and encouraged him to not drink more than cc recommended guidelines of 1-2 alcoholic beverages per day.  He does indicate he does not normally drink however he was in [place] and was drinking more than normal.  Repeat labs in 3 months.

## 2022-03-17 NOTE — ASSESSMENT & PLAN NOTE
This is a new condition.  Reviewed labs with patient indicating mildly elevated fasting blood sugar.  . Patient denies any symptoms of hyperglycemia.  He was drinking alcohol weekend prior to his lab draw.    Plan  Repeat labs in 3 months.  A1c levels added.  Discussed diet lifestyle modifications.

## 2022-03-17 NOTE — PATIENT INSTRUCTIONS
"High Cholesterol    High cholesterol is a condition in which the blood has high levels of a white, waxy, fat-like substance (cholesterol). The human body needs small amounts of cholesterol. The liver makes all the cholesterol that the body needs. Extra (excess) cholesterol comes from the food that we eat.  Cholesterol is carried from the liver by the blood through the blood vessels. If you have high cholesterol, deposits (plaques) may build up on the walls of your blood vessels (arteries). Plaques make the arteries narrower and stiffer. Cholesterol plaques increase your risk for heart attack and stroke. Work with your health care provider to keep your cholesterol levels in a healthy range.  What increases the risk?  This condition is more likely to develop in people who:  · Eat foods that are high in animal fat (saturated fat) or cholesterol.  · Are overweight.  · Are not getting enough exercise.  · Have a family history of high cholesterol.  What are the signs or symptoms?  There are no symptoms of this condition.  How is this diagnosed?  This condition may be diagnosed from the results of a blood test.  · If you are older than age 20, your health care provider may check your cholesterol every 4-6 years.  · You may be checked more often if you already have high cholesterol or other risk factors for heart disease.  The blood test for cholesterol measures:  · \"Bad\" cholesterol (LDL cholesterol). This is the main type of cholesterol that causes heart disease. The desired level for LDL is less than 100.  · \"Good\" cholesterol (HDL cholesterol). This type helps to protect against heart disease by cleaning the arteries and carrying the LDL away. The desired level for HDL is 60 or higher.  · Triglycerides. These are fats that the body can store or burn for energy. The desired number for triglycerides is lower than 150.  · Total cholesterol. This is a measure of the total amount of cholesterol in your blood, including LDL " cholesterol, HDL cholesterol, and triglycerides. A healthy number is less than 200.  How is this treated?  This condition is treated with diet changes, lifestyle changes, and medicines.  Diet changes  · This may include eating more whole grains, fruits, vegetables, nuts, and fish.  · This may also include cutting back on red meat and foods that have a lot of added sugar.  Lifestyle changes  · Changes may include getting at least 40 minutes of aerobic exercise 3 times a week. Aerobic exercises include walking, biking, and swimming. Aerobic exercise along with a healthy diet can help you maintain a healthy weight.  · Changes may also include quitting smoking.  Medicines  · Medicines are usually given if diet and lifestyle changes have failed to reduce your cholesterol to healthy levels.  · Your health care provider may prescribe a statin medicine. Statin medicines have been shown to reduce cholesterol, which can reduce the risk of heart disease.  Follow these instructions at home:  Eating and drinking  If told by your health care provider:  · Eat chicken (without skin), fish, veal, shellfish, ground turkey breast, and round or loin cuts of red meat.  · Do not eat fried foods or fatty meats, such as hot dogs and salami.  · Eat plenty of fruits, such as apples.  · Eat plenty of vegetables, such as broccoli, potatoes, and carrots.  · Eat beans, peas, and lentils.  · Eat grains such as barley, rice, couscous, and bulgur wheat.  · Eat pasta without cream sauces.  · Use skim or nonfat milk, and eat low-fat or nonfat yogurt and cheeses.  · Do not eat or drink whole milk, cream, ice cream, egg yolks, or hard cheeses.  · Do not eat stick margarine or tub margarines that contain trans fats (also called partially hydrogenated oils).  · Do not eat saturated tropical oils, such as coconut oil and palm oil.  · Do not eat cakes, cookies, crackers, or other baked goods that contain trans fats.    General instructions  · Exercise as  directed by your health care provider. Increase your activity level with activities such as gardening, walking, and taking the stairs.  · Take over-the-counter and prescription medicines only as told by your health care provider.  · Do not use any products that contain nicotine or tobacco, such as cigarettes and e-cigarettes. If you need help quitting, ask your health care provider.  · Keep all follow-up visits as told by your health care provider. This is important.  Contact a health care provider if:  · You are struggling to maintain a healthy diet or weight.  · You need help to start on an exercise program.  · You need help to stop smoking.  Get help right away if:  · You have chest pain.  · You have trouble breathing.  This information is not intended to replace advice given to you by your health care provider. Make sure you discuss any questions you have with your health care provider.  Document Released: 12/18/2006 Document Revised: 12/21/2018 Document Reviewed: 06/17/2017  [employer] Patient Education © 2020 [employer]    Fish Oil, Omega-3 Fatty Acids capsules (Rx)  What is this medicine?  FISH OIL, OMEGA-3 FATTY ACIDS (Fish Oil, oh MAY ga 3 fatty AS ids) are essential fats. It is used to treat high triglyceride levels.  This medicine may be used for other purposes; ask your health care provider or pharmacist if you have questions.  COMMON BRAND NAME(S): Lovaza, Magna Floyd-3, [employer] Nutritionals Floyd-3 1450, [employer] Blue Omega, [place] Blue Professional Floyd-3 2100, [place], Omega-3, Ovega-3, Triklo  What should I tell my health care provider before I take this medicine?  They need to know if you have any of these conditions  · bleeding problems  · history of irregular heartbeat  · lung or breathing disease, like asthma  · an unusual or allergic reaction to fish oil, omega-3 fatty acids, fish, other medicines, foods, dyes, or preservatives  · pregnant or trying to get pregnant  · breast-feeding  How should  I use this medicine?  Take this medicine by mouth with a glass of water. Follow the directions on the prescription label. Lovaza and Epanova may be taken with or without food. Take Omtryg with food. Take your medicine at regular intervals. Do not take your medicine more often than directed.  Talk to your pediatrician regarding the use of this medicine in children. Special care may be needed.  Overdosage: If you think you have taken too much of this medicine contact a poison control center or emergency room at once.  NOTE: This medicine is only for you. Do not share this medicine with others.  What if I miss a dose?  If you miss a dose, take it as soon as you can. If it is almost time for your next dose, take only that dose. Do not take double or extra doses.  What may interact with this medicine?  · aspirin and aspirin-like medicines  · herbal products like danshen, dong quai, garlic pills, faiza, ginkgo biloba, horse chestnut, willow bark, and others  · medicines that treat or prevent blood clots like enoxaparin, heparin, warfarin  This list may not describe all possible interactions. Give your health care provider a list of all the medicines, herbs, non-prescription drugs, or dietary supplements you use. Also tell them if you smoke, drink alcohol, or use illegal drugs. Some items may interact with your medicine.  What should I watch for while using this medicine?  Follow a good diet and exercise plan. Taking this medicine does not replace a healthy lifestyle. Some foods that have omega-3 fatty acids naturally are fatty fish like albacore tuna, halibut, herring, mackerel, lake trout, salmon, and sardines.  If you are scheduled for any medical or dental procedure, tell your healthcare provider that you are taking this medicine. You may need to stop taking this medicine before the procedure.  What side effects may I notice from receiving this medicine?  Side effects that you should report to your doctor or health  care professional as soon as possible:  · allergic reactions like skin rash, itching or hives, swelling of the face, lips, or tongue  · breathing problems  · chest pain  · fever, infection  · unusual bleeding or bruising  Side effects that usually do not require medical attention (report to your doctor or health care professional if they continue or are bothersome):  · bad or fishy breath  · belching  · body odor  · diarrhea  · nausea  · stomach gas, upset  · weight gain  This list may not describe all possible side effects. Call your doctor for medical advice about side effects. You may report side effects to FDA at 6-649-MTS-2121.  Where should I keep my medicine?  Keep out of the reach of children.  Store at room temperature between 15 and 30 degrees C (59 and 86 degrees F). Do not freeze. Throw away any unused medicine after the expiration date.  NOTE: This sheet is a summary. It may not cover all possible information. If you have questions about this medicine, talk to your doctor, pharmacist, or health care provider.  © 2020 Elsevier/Gold Standard (2014-05-07 11:43:34)

## 2022-03-17 NOTE — ASSESSMENT & PLAN NOTE
The 10-year ASCVD risk score (Leo TOBIN Jr., et al., 2013) is: 6.8%    Values used to calculate the score:      Age: 51 years      Sex: Male      Is Non- : No      Diabetic: No      Tobacco smoker: No      Systolic Blood Pressure: 110 mmHg      Is BP treated: No      HDL Cholesterol: 30 mg/dL      Total Cholesterol: 266 mg/dL    Reviewed labs patient answered all questions.  Patient continues to have elevated total cholesterol, triglycerides, and normal HDL.  He does report that he went to [place] the weekend prior to his labs being drawn which could have affected his values.    He does not currently follow a healthy diet or regular exercise.    Plan  Discussed appropriate diet lifestyle modifications.  Hands were provided in clinic.  Encourage patient to start on omega-3's.  Patient declined wanting to start any prescription medication today.  Discussed risk associated with very elevated triglyceride levels.  Discussed possible referral to lipid clinic in the future.  We will repeat labs in 3 months.

## 2022-10-22 ENCOUNTER — OFFICE VISIT (OUTPATIENT)
Dept: URGENT CARE | Facility: PHYSICIAN GROUP | Age: 52
End: 2022-10-22
Payer: COMMERCIAL

## 2022-10-22 VITALS
TEMPERATURE: 97.4 F | RESPIRATION RATE: 18 BRPM | DIASTOLIC BLOOD PRESSURE: 60 MMHG | HEART RATE: 88 BPM | OXYGEN SATURATION: 96 % | HEIGHT: 67 IN | SYSTOLIC BLOOD PRESSURE: 104 MMHG | BODY MASS INDEX: 30.45 KG/M2 | WEIGHT: 194 LBS

## 2022-10-22 DIAGNOSIS — B96.89 ACUTE BACTERIAL SINUSITIS: ICD-10-CM

## 2022-10-22 DIAGNOSIS — R05.9 COUGH, UNSPECIFIED TYPE: ICD-10-CM

## 2022-10-22 DIAGNOSIS — J01.90 ACUTE BACTERIAL SINUSITIS: ICD-10-CM

## 2022-10-22 DIAGNOSIS — R07.89 DISCOMFORT IN CHEST: ICD-10-CM

## 2022-10-22 PROCEDURE — 99214 OFFICE O/P EST MOD 30 MIN: CPT | Performed by: NURSE PRACTITIONER

## 2022-10-22 PROCEDURE — 93000 ELECTROCARDIOGRAM COMPLETE: CPT | Performed by: NURSE PRACTITIONER

## 2022-10-22 RX ORDER — AMOXICILLIN AND CLAVULANATE POTASSIUM 875; 125 MG/1; MG/1
1 TABLET, FILM COATED ORAL 2 TIMES DAILY
Qty: 14 TABLET | Refills: 0 | Status: SHIPPED | OUTPATIENT
Start: 2022-10-22 | End: 2022-10-29

## 2022-10-22 RX ORDER — ALBUTEROL SULFATE 90 UG/1
2 AEROSOL, METERED RESPIRATORY (INHALATION) EVERY 6 HOURS PRN
Qty: 8.5 G | Refills: 0 | Status: SHIPPED | OUTPATIENT
Start: 2022-10-22 | End: 2023-07-12

## 2022-10-22 ASSESSMENT — ENCOUNTER SYMPTOMS
WHEEZING: 0
SHORTNESS OF BREATH: 0
MYALGIAS: 0
SINUS PAIN: 1
COUGH: 1
NAUSEA: 0
CHILLS: 0
HEADACHES: 1
DIZZINESS: 0
FEVER: 0
DIARRHEA: 0
SORE THROAT: 1

## 2022-10-22 ASSESSMENT — FIBROSIS 4 INDEX: FIB4 SCORE: 1.13

## 2022-10-22 NOTE — PROGRESS NOTES
Subjective     Alphonso Escobedo is a 52 y.o. male who presents with Pharyngitis (X 3 days off and on Uri x 3 wks. Sinus HA. Would like inhaler refill )            HPI  New problem.  Mr. Escobedo is a 52-year-old male who presents with a sore throat x3 days that has been intermittent.  He has also been experiencing nasal congestion and sinus headache for 3 weeks and is also requesting a refill on his albuterol inhaler.  He states that he has also been having bilateral chest pain in his pectoral muscles.  He denies nausea, vomiting, diarrhea, fever, chills.  He denies diaphoresis.  He does have a history of hyperlipidemia and impaired fasting glucose upon review of his chart.  He has been doing sinus rinses for his nasal congestion and taking his usual medications.    Patient has no known allergies.  Current Outpatient Medications on File Prior to Visit   Medication Sig Dispense Refill    albuterol 108 (90 Base) MCG/ACT Aero Soln inhalation aerosol Inhale 2 Puffs every 6 hours as needed for Shortness of Breath. 8 g 2     No current facility-administered medications on file prior to visit.     Social History     Socioeconomic History    Marital status:      Spouse name: Not on file    Number of children: Not on file    Years of education: Not on file    Highest education level: Not on file   Occupational History    Not on file   Tobacco Use    Smoking status: Never    Smokeless tobacco: Former     Types: Chew   Vaping Use    Vaping Use: Every day    Substances: Nicotine   Substance and Sexual Activity    Alcohol use: Yes     Comment: occasional    Drug use: No    Sexual activity: Not on file   Other Topics Concern    Not on file   Social History Narrative    Not on file     Social Determinants of Health     Financial Resource Strain: Not on file   Food Insecurity: Not on file   Transportation Needs: Not on file   Physical Activity: Not on file   Stress: Not on file   Social Connections: Not on file   Intimate Partner  "Violence: Not on file   Housing Stability: Not on file     Breast Cancer-related family history is not on file.      Review of Systems   Constitutional:  Negative for chills, fever and malaise/fatigue.   HENT:  Positive for congestion, sinus pain and sore throat.    Respiratory:  Positive for cough. Negative for shortness of breath and wheezing.    Cardiovascular:  Positive for chest pain.   Gastrointestinal:  Negative for diarrhea and nausea.   Musculoskeletal:  Negative for myalgias.   Neurological:  Positive for headaches. Negative for dizziness.            Objective     /60   Pulse 88   Temp 36.3 °C (97.4 °F) (Temporal)   Resp 18   Ht 1.702 m (5' 7\")   Wt 88 kg (194 lb)   SpO2 96%   BMI 30.38 kg/m²      Physical Exam  Constitutional:       General: He is not in acute distress.     Appearance: Normal appearance. He is well-developed. He is not ill-appearing.   HENT:      Head: Normocephalic.      Right Ear: Tympanic membrane and external ear normal.      Left Ear: Tympanic membrane and external ear normal.      Nose: Mucosal edema, congestion and rhinorrhea present.      Mouth/Throat:      Pharynx: No posterior oropharyngeal erythema.   Eyes:      General:         Right eye: No discharge.         Left eye: No discharge.      Conjunctiva/sclera: Conjunctivae normal.   Cardiovascular:      Rate and Rhythm: Normal rate and regular rhythm.      Heart sounds: Normal heart sounds.   Pulmonary:      Effort: Pulmonary effort is normal.      Breath sounds: Normal breath sounds. No wheezing or rales.   Musculoskeletal:         General: Normal range of motion.      Cervical back: Normal range of motion and neck supple.   Lymphadenopathy:      Cervical: No cervical adenopathy.   Skin:     General: Skin is warm and dry.   Neurological:      Mental Status: He is alert and oriented to person, place, and time.   Psychiatric:         Behavior: Behavior normal.         Thought Content: Thought content normal.        "                    Assessment & Plan        1. Acute bacterial sinusitis  amoxicillin-clavulanate (AUGMENTIN) 875-125 MG Tab      2. Cough, unspecified type  albuterol 108 (90 Base) MCG/ACT Aero Soln inhalation aerosol      3. Discomfort in chest  EKG - Clinic Performed        Patient placed on augmentin and his albuterol is refilled.  EKG with sinus rhythm; rate of 70. No acute abnormality noted.  Reviewed results with patient.  Differential diagnosis, natural history, supportive care, and indications for immediate follow-up discussed at length.

## 2022-12-14 DIAGNOSIS — R05.9 COUGH, UNSPECIFIED TYPE: ICD-10-CM

## 2023-03-01 ENCOUNTER — HOSPITAL ENCOUNTER (OUTPATIENT)
Dept: LAB | Facility: MEDICAL CENTER | Age: 53
End: 2023-03-01
Attending: NURSE PRACTITIONER
Payer: COMMERCIAL

## 2023-03-01 DIAGNOSIS — R79.89 LOW TESTOSTERONE IN MALE: ICD-10-CM

## 2023-03-01 DIAGNOSIS — R73.01 IMPAIRED FASTING BLOOD SUGAR: ICD-10-CM

## 2023-03-01 DIAGNOSIS — R74.01 ALT (SGPT) LEVEL RAISED: ICD-10-CM

## 2023-03-01 DIAGNOSIS — E78.2 MIXED DYSLIPIDEMIA: ICD-10-CM

## 2023-03-01 LAB
ALBUMIN SERPL BCP-MCNC: 4.7 G/DL (ref 3.2–4.9)
ALBUMIN/GLOB SERPL: 2 G/DL
ALP SERPL-CCNC: 69 U/L (ref 30–99)
ALT SERPL-CCNC: 45 U/L (ref 2–50)
ANION GAP SERPL CALC-SCNC: 10 MMOL/L (ref 7–16)
AST SERPL-CCNC: 26 U/L (ref 12–45)
BILIRUB SERPL-MCNC: 0.7 MG/DL (ref 0.1–1.5)
BUN SERPL-MCNC: 19 MG/DL (ref 8–22)
CALCIUM ALBUM COR SERPL-MCNC: 9.2 MG/DL (ref 8.5–10.5)
CALCIUM SERPL-MCNC: 9.8 MG/DL (ref 8.5–10.5)
CHLORIDE SERPL-SCNC: 107 MMOL/L (ref 96–112)
CHOLEST SERPL-MCNC: 223 MG/DL (ref 100–199)
CO2 SERPL-SCNC: 26 MMOL/L (ref 20–33)
CREAT SERPL-MCNC: 0.9 MG/DL (ref 0.5–1.4)
EST. AVERAGE GLUCOSE BLD GHB EST-MCNC: 108 MG/DL
FASTING STATUS PATIENT QL REPORTED: NORMAL
GFR SERPLBLD CREATININE-BSD FMLA CKD-EPI: 103 ML/MIN/1.73 M 2
GLOBULIN SER CALC-MCNC: 2.4 G/DL (ref 1.9–3.5)
GLUCOSE SERPL-MCNC: 106 MG/DL (ref 65–99)
HBA1C MFR BLD: 5.4 % (ref 4–5.6)
HDLC SERPL-MCNC: 26 MG/DL
LDLC SERPL CALC-MCNC: 124 MG/DL
POTASSIUM SERPL-SCNC: 4.8 MMOL/L (ref 3.6–5.5)
PROT SERPL-MCNC: 7.1 G/DL (ref 6–8.2)
SODIUM SERPL-SCNC: 143 MMOL/L (ref 135–145)
TESTOST SERPL-MCNC: 322 NG/DL (ref 175–781)
TRIGL SERPL-MCNC: 363 MG/DL (ref 0–149)

## 2023-03-01 PROCEDURE — 80053 COMPREHEN METABOLIC PANEL: CPT

## 2023-03-01 PROCEDURE — 83036 HEMOGLOBIN GLYCOSYLATED A1C: CPT

## 2023-03-01 PROCEDURE — 80061 LIPID PANEL: CPT

## 2023-03-01 PROCEDURE — 36415 COLL VENOUS BLD VENIPUNCTURE: CPT

## 2023-03-01 PROCEDURE — 84403 ASSAY OF TOTAL TESTOSTERONE: CPT

## 2023-03-14 RX ORDER — ALBUTEROL SULFATE 90 UG/1
AEROSOL, METERED RESPIRATORY (INHALATION)
Qty: 8.5 G | Refills: 0 | OUTPATIENT
Start: 2023-03-14

## 2023-04-03 ENCOUNTER — HOSPITAL ENCOUNTER (OUTPATIENT)
Facility: MEDICAL CENTER | Age: 53
End: 2023-04-03
Attending: PHYSICIAN ASSISTANT
Payer: COMMERCIAL

## 2023-04-03 ENCOUNTER — OFFICE VISIT (OUTPATIENT)
Dept: URGENT CARE | Facility: PHYSICIAN GROUP | Age: 53
End: 2023-04-03
Payer: COMMERCIAL

## 2023-04-03 VITALS
SYSTOLIC BLOOD PRESSURE: 110 MMHG | BODY MASS INDEX: 29.03 KG/M2 | OXYGEN SATURATION: 96 % | DIASTOLIC BLOOD PRESSURE: 70 MMHG | HEART RATE: 78 BPM | TEMPERATURE: 97.9 F | RESPIRATION RATE: 14 BRPM | WEIGHT: 185 LBS | HEIGHT: 67 IN

## 2023-04-03 DIAGNOSIS — N30.00 ACUTE CYSTITIS WITHOUT HEMATURIA: ICD-10-CM

## 2023-04-03 DIAGNOSIS — R30.0 DYSURIA: ICD-10-CM

## 2023-04-03 LAB
APPEARANCE UR: CLEAR
BILIRUB UR STRIP-MCNC: ABNORMAL MG/DL
COLOR UR AUTO: ABNORMAL
GLUCOSE UR STRIP.AUTO-MCNC: ABNORMAL MG/DL
KETONES UR STRIP.AUTO-MCNC: ABNORMAL MG/DL
LEUKOCYTE ESTERASE UR QL STRIP.AUTO: ABNORMAL
NITRITE UR QL STRIP.AUTO: ABNORMAL
PH UR STRIP.AUTO: 6 [PH] (ref 5–8)
PROT UR QL STRIP: ABNORMAL MG/DL
RBC UR QL AUTO: ABNORMAL
SP GR UR STRIP.AUTO: 1.02
UROBILINOGEN UR STRIP-MCNC: 0.2 MG/DL

## 2023-04-03 PROCEDURE — 81002 URINALYSIS NONAUTO W/O SCOPE: CPT | Performed by: PHYSICIAN ASSISTANT

## 2023-04-03 PROCEDURE — 87086 URINE CULTURE/COLONY COUNT: CPT

## 2023-04-03 PROCEDURE — 99214 OFFICE O/P EST MOD 30 MIN: CPT | Performed by: PHYSICIAN ASSISTANT

## 2023-04-03 PROCEDURE — 99000 SPECIMEN HANDLING OFFICE-LAB: CPT | Performed by: PHYSICIAN ASSISTANT

## 2023-04-03 RX ORDER — SULFAMETHOXAZOLE AND TRIMETHOPRIM 800; 160 MG/1; MG/1
1 TABLET ORAL EVERY 12 HOURS
Qty: 20 TABLET | Refills: 0 | Status: SHIPPED | OUTPATIENT
Start: 2023-04-03 | End: 2023-04-13

## 2023-04-03 ASSESSMENT — ENCOUNTER SYMPTOMS
CHILLS: 0
FLANK PAIN: 0
SWEATS: 0
NAUSEA: 0
VOMITING: 0

## 2023-04-03 ASSESSMENT — FIBROSIS 4 INDEX: FIB4 SCORE: 0.99

## 2023-04-05 LAB
BACTERIA UR CULT: NORMAL
SIGNIFICANT IND 70042: NORMAL
SITE SITE: NORMAL
SOURCE SOURCE: NORMAL

## 2023-04-06 NOTE — RESULT ENCOUNTER NOTE
Yossi Werner,    Your urine culture was negative.  If you feel your symptoms are improving with the antibiotic you may continue taking it.  I will also place referral to have you follow-up with urology.  Please be immediately reevaluated with any new or worsening symptoms.    RegardsARMIDA

## 2023-07-12 ENCOUNTER — OFFICE VISIT (OUTPATIENT)
Dept: MEDICAL GROUP | Facility: PHYSICIAN GROUP | Age: 53
End: 2023-07-12
Payer: COMMERCIAL

## 2023-07-12 VITALS
OXYGEN SATURATION: 97 % | DIASTOLIC BLOOD PRESSURE: 78 MMHG | TEMPERATURE: 98.9 F | BODY MASS INDEX: 29.98 KG/M2 | HEART RATE: 72 BPM | SYSTOLIC BLOOD PRESSURE: 126 MMHG | HEIGHT: 67 IN | WEIGHT: 191 LBS

## 2023-07-12 DIAGNOSIS — J30.2 SEASONAL ALLERGIES: ICD-10-CM

## 2023-07-12 DIAGNOSIS — E78.2 MIXED DYSLIPIDEMIA: ICD-10-CM

## 2023-07-12 DIAGNOSIS — R73.01 IMPAIRED FASTING BLOOD SUGAR: ICD-10-CM

## 2023-07-12 DIAGNOSIS — B07.0 PLANTAR WART OF LEFT FOOT: ICD-10-CM

## 2023-07-12 DIAGNOSIS — Z12.11 COLON CANCER SCREENING: ICD-10-CM

## 2023-07-12 DIAGNOSIS — N40.0 ENLARGED PROSTATE: ICD-10-CM

## 2023-07-12 DIAGNOSIS — Z76.89 ENCOUNTER TO ESTABLISH CARE: ICD-10-CM

## 2023-07-12 PROBLEM — J45.909 REACTIVE AIRWAY DISEASE: Status: ACTIVE | Noted: 2023-07-12

## 2023-07-12 PROBLEM — J45.909 REACTIVE AIRWAY DISEASE: Status: RESOLVED | Noted: 2023-07-12 | Resolved: 2023-07-12

## 2023-07-12 PROBLEM — R74.01 ALT (SGPT) LEVEL RAISED: Status: RESOLVED | Noted: 2022-03-16 | Resolved: 2023-07-12

## 2023-07-12 PROCEDURE — 3074F SYST BP LT 130 MM HG: CPT | Performed by: STUDENT IN AN ORGANIZED HEALTH CARE EDUCATION/TRAINING PROGRAM

## 2023-07-12 PROCEDURE — 99214 OFFICE O/P EST MOD 30 MIN: CPT | Performed by: STUDENT IN AN ORGANIZED HEALTH CARE EDUCATION/TRAINING PROGRAM

## 2023-07-12 PROCEDURE — 3078F DIAST BP <80 MM HG: CPT | Performed by: STUDENT IN AN ORGANIZED HEALTH CARE EDUCATION/TRAINING PROGRAM

## 2023-07-12 RX ORDER — ALBUTEROL SULFATE 90 UG/1
2 AEROSOL, METERED RESPIRATORY (INHALATION) EVERY 6 HOURS PRN
Qty: 8 G | Refills: 1 | Status: SHIPPED | OUTPATIENT
Start: 2023-07-12

## 2023-07-12 RX ORDER — TAMSULOSIN HYDROCHLORIDE 0.4 MG/1
0.4 CAPSULE ORAL
COMMUNITY
Start: 2023-07-09

## 2023-07-12 RX ORDER — SULFAMETHOXAZOLE AND TRIMETHOPRIM 800; 160 MG/1; MG/1
TABLET ORAL
COMMUNITY
End: 2023-07-12

## 2023-07-12 RX ORDER — TRIAMCINOLONE ACETONIDE 40 MG/ML
40 INJECTION, SUSPENSION INTRA-ARTICULAR; INTRAMUSCULAR ONCE
Status: COMPLETED | OUTPATIENT
Start: 2023-07-12 | End: 2023-07-12

## 2023-07-12 RX ORDER — AMOXICILLIN AND CLAVULANATE POTASSIUM 875; 125 MG/1; MG/1
1 TABLET, FILM COATED ORAL 2 TIMES DAILY
COMMUNITY
End: 2023-07-12

## 2023-07-12 RX ADMIN — TRIAMCINOLONE ACETONIDE 40 MG: 40 INJECTION, SUSPENSION INTRA-ARTICULAR; INTRAMUSCULAR at 11:42

## 2023-07-12 ASSESSMENT — PATIENT HEALTH QUESTIONNAIRE - PHQ9: CLINICAL INTERPRETATION OF PHQ2 SCORE: 0
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
## 2023-07-13 NOTE — ASSESSMENT & PLAN NOTE
Patient reports allergies have not been well controlled despite using OTC allergy medication, flonase, and neti pot.  Patient has never seen an Allergist.  Patient received kenalog injection in 2021.  Side effects of kenalog were discussed and patient is requesting an injection today.

## 2023-09-12 ENCOUNTER — HOSPITAL ENCOUNTER (OUTPATIENT)
Dept: LAB | Facility: MEDICAL CENTER | Age: 53
End: 2023-09-12
Attending: PHYSICIAN ASSISTANT
Payer: COMMERCIAL

## 2023-09-12 LAB — PSA SERPL-MCNC: 1.58 NG/ML (ref 0–4)

## 2023-09-12 PROCEDURE — 84153 ASSAY OF PSA TOTAL: CPT

## 2023-09-12 PROCEDURE — 36415 COLL VENOUS BLD VENIPUNCTURE: CPT

## 2023-11-23 ENCOUNTER — OFFICE VISIT (OUTPATIENT)
Dept: URGENT CARE | Facility: PHYSICIAN GROUP | Age: 53
End: 2023-11-23
Payer: COMMERCIAL

## 2023-11-23 VITALS
SYSTOLIC BLOOD PRESSURE: 100 MMHG | OXYGEN SATURATION: 100 % | HEIGHT: 67 IN | RESPIRATION RATE: 18 BRPM | DIASTOLIC BLOOD PRESSURE: 62 MMHG | TEMPERATURE: 97.5 F | BODY MASS INDEX: 29.51 KG/M2 | WEIGHT: 188 LBS | HEART RATE: 62 BPM

## 2023-11-23 DIAGNOSIS — H61.21 CERUMINOSIS, RIGHT: ICD-10-CM

## 2023-11-23 DIAGNOSIS — S00.411A ABRASION OF RIGHT EAR CANAL, INITIAL ENCOUNTER: ICD-10-CM

## 2023-11-23 PROCEDURE — 3074F SYST BP LT 130 MM HG: CPT | Performed by: FAMILY MEDICINE

## 2023-11-23 PROCEDURE — 3078F DIAST BP <80 MM HG: CPT | Performed by: FAMILY MEDICINE

## 2023-11-23 PROCEDURE — 99213 OFFICE O/P EST LOW 20 MIN: CPT | Performed by: FAMILY MEDICINE

## 2023-11-23 RX ORDER — TAMSULOSIN HYDROCHLORIDE 0.4 MG/1
1 CAPSULE ORAL
COMMUNITY
End: 2023-11-23

## 2023-11-23 RX ORDER — NEOMYCIN SULFATE, POLYMYXIN B SULFATE, HYDROCORTISONE 3.5; 10000; 1 MG/ML; [USP'U]/ML; MG/ML
SOLUTION/ DROPS AURICULAR (OTIC)
Qty: 10 ML | Refills: 0 | Status: SHIPPED | OUTPATIENT
Start: 2023-11-23
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
## 2024-06-17 DIAGNOSIS — Z12.5 PROSTATE CANCER SCREENING: ICD-10-CM

## 2024-06-17 DIAGNOSIS — R73.01 IMPAIRED FASTING BLOOD SUGAR: ICD-10-CM

## 2024-06-17 DIAGNOSIS — Z00.00 ROUTINE HEALTH MAINTENANCE: ICD-10-CM

## 2024-06-17 DIAGNOSIS — E78.2 MIXED DYSLIPIDEMIA: ICD-10-CM

## 2024-06-18 DIAGNOSIS — R79.89 LOW TESTOSTERONE: ICD-10-CM

## 2024-07-12 ENCOUNTER — APPOINTMENT (OUTPATIENT)
Dept: MEDICAL GROUP | Facility: PHYSICIAN GROUP | Age: 54
End: 2024-07-12
Payer: COMMERCIAL

## 2024-07-30 ENCOUNTER — HOSPITAL ENCOUNTER (OUTPATIENT)
Dept: LAB | Facility: MEDICAL CENTER | Age: 54
End: 2024-07-30
Attending: STUDENT IN AN ORGANIZED HEALTH CARE EDUCATION/TRAINING PROGRAM
Payer: COMMERCIAL

## 2024-07-30 DIAGNOSIS — R79.89 LOW TESTOSTERONE: ICD-10-CM

## 2024-07-30 DIAGNOSIS — E78.2 MIXED DYSLIPIDEMIA: ICD-10-CM

## 2024-07-30 DIAGNOSIS — R73.01 IMPAIRED FASTING BLOOD SUGAR: ICD-10-CM

## 2024-07-30 DIAGNOSIS — Z00.00 ROUTINE HEALTH MAINTENANCE: ICD-10-CM

## 2024-07-30 DIAGNOSIS — Z12.5 PROSTATE CANCER SCREENING: ICD-10-CM

## 2024-07-30 LAB
ALBUMIN SERPL BCP-MCNC: 4.5 G/DL (ref 3.2–4.9)
ALBUMIN/GLOB SERPL: 1.7 G/DL
ALP SERPL-CCNC: 64 U/L (ref 30–99)
ALT SERPL-CCNC: 32 U/L (ref 2–50)
ANION GAP SERPL CALC-SCNC: 11 MMOL/L (ref 7–16)
AST SERPL-CCNC: 23 U/L (ref 12–45)
BILIRUB SERPL-MCNC: 0.5 MG/DL (ref 0.1–1.5)
BUN SERPL-MCNC: 21 MG/DL (ref 8–22)
CALCIUM ALBUM COR SERPL-MCNC: 9.3 MG/DL (ref 8.5–10.5)
CALCIUM SERPL-MCNC: 9.7 MG/DL (ref 8.5–10.5)
CHLORIDE SERPL-SCNC: 106 MMOL/L (ref 96–112)
CHOLEST SERPL-MCNC: 239 MG/DL (ref 100–199)
CO2 SERPL-SCNC: 23 MMOL/L (ref 20–33)
CREAT SERPL-MCNC: 1.04 MG/DL (ref 0.5–1.4)
ERYTHROCYTE [DISTWIDTH] IN BLOOD BY AUTOMATED COUNT: 41.1 FL (ref 35.9–50)
FASTING STATUS PATIENT QL REPORTED: NORMAL
GFR SERPLBLD CREATININE-BSD FMLA CKD-EPI: 86 ML/MIN/1.73 M 2
GLOBULIN SER CALC-MCNC: 2.6 G/DL (ref 1.9–3.5)
GLUCOSE SERPL-MCNC: 105 MG/DL (ref 65–99)
HCT VFR BLD AUTO: 45.1 % (ref 42–52)
HDLC SERPL-MCNC: 29 MG/DL
HGB BLD-MCNC: 15.3 G/DL (ref 14–18)
LDLC SERPL CALC-MCNC: ABNORMAL MG/DL
MCH RBC QN AUTO: 31 PG (ref 27–33)
MCHC RBC AUTO-ENTMCNC: 33.9 G/DL (ref 32.3–36.5)
MCV RBC AUTO: 91.5 FL (ref 81.4–97.8)
PLATELET # BLD AUTO: 205 K/UL (ref 164–446)
PMV BLD AUTO: 11.2 FL (ref 9–12.9)
POTASSIUM SERPL-SCNC: 4.7 MMOL/L (ref 3.6–5.5)
PROT SERPL-MCNC: 7.1 G/DL (ref 6–8.2)
PSA SERPL-MCNC: 1.56 NG/ML (ref 0–4)
RBC # BLD AUTO: 4.93 M/UL (ref 4.7–6.1)
SODIUM SERPL-SCNC: 140 MMOL/L (ref 135–145)
TRIGL SERPL-MCNC: 496 MG/DL (ref 0–149)
WBC # BLD AUTO: 6.4 K/UL (ref 4.8–10.8)

## 2024-07-30 PROCEDURE — 36415 COLL VENOUS BLD VENIPUNCTURE: CPT

## 2024-07-30 PROCEDURE — 84153 ASSAY OF PSA TOTAL: CPT

## 2024-07-30 PROCEDURE — 84403 ASSAY OF TOTAL TESTOSTERONE: CPT

## 2024-07-30 PROCEDURE — 80061 LIPID PANEL: CPT

## 2024-07-30 PROCEDURE — 80053 COMPREHEN METABOLIC PANEL: CPT

## 2024-07-30 PROCEDURE — 85027 COMPLETE CBC AUTOMATED: CPT

## 2024-07-30 PROCEDURE — 84402 ASSAY OF FREE TESTOSTERONE: CPT

## 2024-07-30 PROCEDURE — 84270 ASSAY OF SEX HORMONE GLOBUL: CPT

## 2024-08-01 LAB
SHBG SERPL-SCNC: 34 NMOL/L (ref 19–76)
TESTOST FREE MFR SERPL: 1.8 % (ref 1.6–2.9)
TESTOST FREE SERPL-MCNC: 55 PG/ML (ref 47–244)
TESTOST SERPL-MCNC: 308 NG/DL (ref 300–890)

## 2024-08-05 ENCOUNTER — RESEARCH ENCOUNTER (OUTPATIENT)
Dept: RESEARCH | Facility: MEDICAL CENTER | Age: 54
End: 2024-08-05

## 2024-08-29 ENCOUNTER — APPOINTMENT (OUTPATIENT)
Dept: MEDICAL GROUP | Facility: PHYSICIAN GROUP | Age: 54
End: 2024-08-29
Payer: COMMERCIAL

## 2024-08-29 VITALS
WEIGHT: 182.4 LBS | HEIGHT: 67 IN | OXYGEN SATURATION: 97 % | SYSTOLIC BLOOD PRESSURE: 110 MMHG | HEART RATE: 85 BPM | TEMPERATURE: 98.4 F | DIASTOLIC BLOOD PRESSURE: 62 MMHG | BODY MASS INDEX: 28.63 KG/M2

## 2024-08-29 DIAGNOSIS — R79.89 LOW TESTOSTERONE: ICD-10-CM

## 2024-08-29 DIAGNOSIS — N40.0 ENLARGED PROSTATE: ICD-10-CM

## 2024-08-29 DIAGNOSIS — H61.23 BILATERAL IMPACTED CERUMEN: ICD-10-CM

## 2024-08-29 DIAGNOSIS — Z76.89 ENCOUNTER TO ESTABLISH CARE: ICD-10-CM

## 2024-08-29 DIAGNOSIS — E78.2 MIXED DYSLIPIDEMIA: ICD-10-CM

## 2024-08-29 DIAGNOSIS — J30.2 SEASONAL ALLERGIES: ICD-10-CM

## 2024-08-29 RX ORDER — FENOFIBRATE 145 MG/1
145 TABLET, COATED ORAL DAILY
Qty: 90 TABLET | Refills: 3 | Status: SHIPPED | OUTPATIENT
Start: 2024-08-29

## 2024-08-29 RX ORDER — ALBUTEROL SULFATE 90 UG/1
2 AEROSOL, METERED RESPIRATORY (INHALATION) EVERY 6 HOURS PRN
Qty: 8 G | Refills: 2 | Status: SHIPPED | OUTPATIENT
Start: 2024-08-29

## 2024-08-29 ASSESSMENT — PATIENT HEALTH QUESTIONNAIRE - PHQ9: CLINICAL INTERPRETATION OF PHQ2 SCORE: 0

## 2024-08-29 ASSESSMENT — FIBROSIS 4 INDEX: FIB4 SCORE: 1.05

## 2024-08-29 NOTE — ASSESSMENT & PLAN NOTE
July 2024 testosterone 308.  Patient reports she was on androgel and injections in the past but insurance stopped covering them.  Currently using OTC nugenix total T.

## 2024-08-29 NOTE — PROGRESS NOTES
Subjective:     CC:   Chief Complaint   Patient presents with    Lab Results    Medication Refill       HPI:   Alphonso Escobedo is a 54 y.o. male who presents for an annual exam. He is feeling well and has no complaints.    Enlarged prostate  Follows up with Urology.  Patient stopped flomax due to side effects and is currently using OTC supplement with selenium.  July 2024 PSA 1.56.    Low testosterone  July 2024 testosterone 308.  Patient reports she was on androgel and injections in the past but insurance stopped covering them.  Currently using OTC nugenix total T.        Health Maintenance  Cholesterol Screening: July 2024 , , HDL 29, LDL not calculated  Diabetes Screening: July 2024   Aspirin Use: N/A   Diet / Execise: BMI 28.57  Smoking: denies  Substance Abuse: denies  Safe in relationship: yes  Dentist: follows up regularly  Ophthalmology: follows up regularly    Cancer screening  Colorectal Cancer Screening: colonoscopy Dec 2023, repeat 3  yrs  Prostate Cancer Screening/PSA: July 2024 PSA WNL    Immunizations  Tetanus: reports received a few years ago  Shingles: advised to get at pharmacy      He  has no past medical history of ASTHMA, Diabetes, or IBD (inflammatory bowel disease).  He  has a past surgical history that includes umbilical hernia repair (09/25/2008) and craniotomy (2003).  Family History   Problem Relation Age of Onset    Heart Disease Mother         pacemaker    No Known Problems Sister     Cancer Maternal Uncle         prostate    Other Paternal Uncle         hepatitis    Heart Disease Maternal Grandmother     Cancer Paternal Grandmother     Cancer Other         prostate cancer in cousins and dad's uncle    Ovarian Cancer Neg Hx     Tubal Cancer Neg Hx     Peritoneal Cancer Neg Hx     Colorectal Cancer Neg Hx     Breast Cancer Neg Hx      Social History     Tobacco Use    Smoking status: Never    Smokeless tobacco: Former     Types: Chew    Tobacco comments:     Chewed  "age 13 to 41   Vaping Use    Vaping status: Every Day    Substances: Nicotine   Substance Use Topics    Alcohol use: Yes     Comment: occasional    Drug use: No       Patient Active Problem List    Diagnosis Date Noted    Low testosterone 06/18/2024    Impaired fasting blood sugar 03/16/2022    Mixed dyslipidemia 07/13/2021    Fatigue 06/16/2021    Enlarged prostate 06/16/2021    Seasonal allergies 06/16/2021       Current Outpatient Medications   Medication Sig Dispense Refill    fenofibrate (TRICOR) 145 MG Tab Take 1 Tablet by mouth every day. 90 Tablet 3    albuterol 108 (90 Base) MCG/ACT Aero Soln inhalation aerosol Inhale 2 Puffs every 6 hours as needed for Shortness of Breath. 8 g 2    NON SPECIFIED Vitamin d  Nugenix total t for low testosterone  Otc supplementat with selenium for BPH  Otc supplement with citrus bergamot for cholesterol       No current facility-administered medications for this visit.    (including changes today)  Allergies: Patient has no known allergies.    Review of Systems   Constitutional: Negative for fever, chills and malaise/fatigue.   HENT: Negative for congestion.    Eyes: Negative for pain.   Respiratory: Negative for cough and shortness of breath.    Cardiovascular: Negative for leg swelling.   Gastrointestinal: Negative for nausea, vomiting, abdominal pain and diarrhea.   Genitourinary: Negative for dysuria and hematuria.   Skin: Negative for rash.   Neurological: Negative for dizziness, focal weakness and headaches.   Endo/Heme/Allergies: Does not bleed easily.   Psychiatric/Behavioral: Negative for depression.  The patient is not nervous/anxious.      Objective:     /62 (BP Location: Right arm, Patient Position: Sitting, BP Cuff Size: Adult)   Pulse 85   Temp 36.9 °C (98.4 °F) (Temporal)   Ht 1.702 m (5' 7\")   Wt 82.7 kg (182 lb 6.4 oz)   SpO2 97%   BMI 28.57 kg/m²   Body mass index is 28.57 kg/m².  Wt Readings from Last 4 Encounters:   08/29/24 82.7 kg (182 lb 6.4 " oz)   11/23/23 85.3 kg (188 lb)   07/12/23 86.6 kg (191 lb)   04/03/23 83.9 kg (185 lb)       Physical Exam:  Constitutional: Well-developed and well-nourished. No acute distress.   Skin: Skin is warm and dry. No rash noted.  Head: Atraumatic without lesions.  Eyes: Conjunctivae and extraocular motions are normal. Pupils are equal, round, and reactive to light. No scleral icterus.   Ears:  External ears unremarkable.  Cerumen impaction bilaterally and unable to visualize tympanic membranes.  Mouth/Throat: Dentition is good. Tongue normal. Oropharynx is clear and moist. Posterior pharynx without erythema or exudates.  Neck: Supple, trachea midline. Normal range of motion. No thyromegaly. No lymphadenopathy.  Cardiovascular: Regular rate and rhythm, S1 and S2 without murmur, rubs, or gallops.    Lungs: Effort normal. Clear to auscultation bilaterally.  Abdomen: Soft, non tender, and without distention. Active bowel sounds.  Extremities: No cyanosis, clubbing, erythema, nor edema.  Musculoskeletal: Normal ROM in all extremities.  Neurological: Alert and oriented x 3.  No acute focal deficits.  Psychiatric:  Behavior, mood, and affect are appropriate.    Labs:  Hospital Outpatient Visit on 07/30/2024   Component Date Value Ref Range Status    Testosterone,Total 07/30/2024 308  300 - 890 ng/dL Final    Comment: INTERPRETIVE INFORMATION: Testosterone by Immunoassay  Testosterone immunoassays are both imprecise and inaccurate at low  testosterone concentrations, such as those found in children and  cisgender females. For these individuals, testing by mass  spectrometry is recommended; refer to Testosterone (Adult Females,  Children, or Individuals on Testosterone-Suppressing Hormone  Therapy) ([facility] test code 9698276). Free or bioavailable  testosterone measurements may provide supportive information.  For individuals on testosterone hormone therapy, refer to  cisgender male reference intervals. No reference intervals  have  been established for males younger than 14 years or for cisgender  females. For a complete set of all established reference  intervals, refer to [facility]/Tests/Pub/[id number].      Sex Hormone Bind Globulin 07/30/2024 34  19 - 76 nmol/L Final    Comment: REFERENCE INTERVAL: Sex Hormone Binding Globulin  Access complete set of age- and/or gender-specific reference  intervals for this test in the [facility] Test Directory  ([email or web address]).      Free Testosterone 07/30/2024 55  47 - 244 pg/mL Final    Comment: INTERPRETIVE INFORMATION:  Testosterone, Free Calculation  Free testosterone concentration is calculated using total  testosterone (measured by immunoassay) and the binding constant of  testosterone and sex hormone-binding globulin (SHBG). Testosterone  immunoassays are both imprecise and inaccurate at low testosterone  concentrations, such as those found in children and cisgender  females. For these individuals, testing by mass spectrometry is  recommended; refer to Testosterone, Free (Adult Females, Children,  or Individuals on Testosterone-Suppressing Hormone Therapy) ([facility]  test code 0961630).  For individuals on testosterone hormone therapy, refer to  cisgender male reference intervals. No reference intervals have  been established for males younger than 14 years or for cisgender  females. For a complete set of all established reference  intervals, refer to [facility]/Tests/Pub/[id number].      Testosterone % Free 07/30/2024 1.8  1.6 - 2.9 % Final    Comment: Performed By: [facility]  [address] [place] [id number]  : Lester Wiley MD, PhD  CLIA Number: [id number]      Prostatic Specific Antigen Tot 07/30/2024 1.56  0.00 - 4.00 ng/mL Final    Comment: Performed using Roche sanjeev e immunoassay analyzer. tPSA values determined  on patient samples by different testing procedures cannot be directly  compared with one another and could be the cause of  erroneous medical  interpretations. If there is a change in the tPSA assay procedure used while  monitoring therapy, then new baselines may need to be established when  comparing previous results.      Cholesterol,Tot 07/30/2024 239 (H)  100 - 199 mg/dL Final    Triglycerides 07/30/2024 496 (H)  0 - 149 mg/dL Final    HDL 07/30/2024 29 (A)  >=40 mg/dL Final    LDL 07/30/2024 see below  <100 mg/dL Final    Comment: The calculated LDL value is invalid due to the triglyceride  value of >400 mg/dL.      Sodium 07/30/2024 140  135 - 145 mmol/L Final    Potassium 07/30/2024 4.7  3.6 - 5.5 mmol/L Final    Chloride 07/30/2024 106  96 - 112 mmol/L Final    Co2 07/30/2024 23  20 - 33 mmol/L Final    Anion Gap 07/30/2024 11.0  7.0 - 16.0 Final    Glucose 07/30/2024 105 (H)  65 - 99 mg/dL Final    Bun 07/30/2024 21  8 - 22 mg/dL Final    Creatinine 07/30/2024 1.04  0.50 - 1.40 mg/dL Final    Calcium 07/30/2024 9.7  8.5 - 10.5 mg/dL Final    Correct Calcium 07/30/2024 9.3  8.5 - 10.5 mg/dL Final    AST(SGOT) 07/30/2024 23  12 - 45 U/L Final    ALT(SGPT) 07/30/2024 32  2 - 50 U/L Final    Alkaline Phosphatase 07/30/2024 64  30 - 99 U/L Final    Total Bilirubin 07/30/2024 0.5  0.1 - 1.5 mg/dL Final    Albumin 07/30/2024 4.5  3.2 - 4.9 g/dL Final    Total Protein 07/30/2024 7.1  6.0 - 8.2 g/dL Final    Globulin 07/30/2024 2.6  1.9 - 3.5 g/dL Final    A-G Ratio 07/30/2024 1.7  g/dL Final    WBC 07/30/2024 6.4  4.8 - 10.8 K/uL Final    RBC 07/30/2024 4.93  4.70 - 6.10 M/uL Final    Hemoglobin 07/30/2024 15.3  14.0 - 18.0 g/dL Final    Hematocrit 07/30/2024 45.1  42.0 - 52.0 % Final    MCV 07/30/2024 91.5  81.4 - 97.8 fL Final    MCH 07/30/2024 31.0  27.0 - 33.0 pg Final    MCHC 07/30/2024 33.9  32.3 - 36.5 g/dL Final    RDW 07/30/2024 41.1  35.9 - 50.0 fL Final    Platelet Count 07/30/2024 205  164 - 446 K/uL Final    MPV 07/30/2024 11.2  9.0 - 12.9 fL Final    Fasting Status 07/30/2024 Fasting   Final    GFR (CKD-EPI) 07/30/2024 86   >60 mL/min/1.73 m 2 Final    Comment: Estimated Glomerular Filtration Rate is calculated using  race neutral CKD-EPI 2021 equation per [facility]-ASN recommendations.           Assessment and Plan:     1. Encounter to establish care  Annual preventive exam done today.  Labs were reviewed with the patient.  UTD with prostate and colon cancer screening.    2. Mixed dyslipidemia  Chronic, uncontrolled.  , , HDL 29, LDL not calculated.  Prescribed fenofibrate 145 mg daily and given referral to lipid clinic for further management.  - fenofibrate (TRICOR) 145 MG Tab; Take 1 Tablet by mouth every day.  Dispense: 90 Tablet; Refill: 3  - Referral to Lipid Clinic    3. Bilateral impacted cerumen  Chronic, uncontrolled.  Advised to use earwax softener and avoid Q-tips.  Patient will return for ear lavage.    4. Seasonal allergies  Chronic, stable.  Continue albuterol as needed.  - albuterol 108 (90 Base) MCG/ACT Aero Soln inhalation aerosol; Inhale 2 Puffs every 6 hours as needed for Shortness of Breath.  Dispense: 8 g; Refill: 2    5. Enlarged prostate  Chronic, stable.  Follows up with urology.  PSA WNL.  Patient could not tolerate Flomax and is using OTC supplement.    6. Low testosterone  Chronic, stable.  Follows up with urology.  Testosterone WNL.  Currently using OTC supplement due to insurance not covering testosterone replacement.      Anticipatory guidance  --Discussed moderation in sodium/caffeine intake, saturated fat and cholesterol, caloric balance, sufficient fresh fruits/vegetables, fiber.  --Discussed brushing, flossing, and dental visits.   --Encouraged 150 minutes of exercise weekly.   --Discussed tobacco, alcohol, and other drug use.  --Discussed safety belts, safety helmets, smoke detector, gun safety etc.  --Discussed sun protection with minimum of spf 30.      Follow-up: Return in about 1 week (around 9/5/2024) for ear lavage.

## 2024-08-29 NOTE — ASSESSMENT & PLAN NOTE
Follows up with Urology.  Patient stopped flomax due to side effects and is currently using OTC supplement with selenium.  July 2024 PSA 1.56.

## 2024-09-11 ENCOUNTER — TELEPHONE (OUTPATIENT)
Dept: VASCULAR LAB | Facility: MEDICAL CENTER | Age: 54
End: 2024-09-11
Payer: COMMERCIAL

## 2024-09-11 NOTE — TELEPHONE ENCOUNTER
Spoke to patient in regarding NP appointment.    Any recent testing (labs or imaging) outside of [facility]?  No    Were any records requested?  No    New patient packet sent via [facility] or mail?  Yes    Referral attached to appointment (renewals and New patients only)? Yes    Is appointment virtual? No

## 2024-09-19 ENCOUNTER — OFFICE VISIT (OUTPATIENT)
Dept: VASCULAR LAB | Facility: MEDICAL CENTER | Age: 54
End: 2024-09-19
Attending: FAMILY MEDICINE
Payer: COMMERCIAL

## 2024-09-19 VITALS
SYSTOLIC BLOOD PRESSURE: 106 MMHG | HEIGHT: 67 IN | WEIGHT: 175 LBS | BODY MASS INDEX: 27.47 KG/M2 | HEART RATE: 63 BPM | DIASTOLIC BLOOD PRESSURE: 66 MMHG

## 2024-09-19 DIAGNOSIS — Z91.89 OTHER SPECIFIED PERSONAL RISK FACTORS, NOT ELSEWHERE CLASSIFIED: ICD-10-CM

## 2024-09-19 DIAGNOSIS — E88.810 METABOLIC SYNDROME: ICD-10-CM

## 2024-09-19 DIAGNOSIS — E78.1 HYPERTRIGLYCERIDEMIA: ICD-10-CM

## 2024-09-19 DIAGNOSIS — E66.3 OVERWEIGHT (BMI 25.0-29.9): ICD-10-CM

## 2024-09-19 DIAGNOSIS — R73.03 PREDIABETES: ICD-10-CM

## 2024-09-19 DIAGNOSIS — E78.49 FCHL (FAMILIAL COMBINED HYPERLIPIDEMIA): ICD-10-CM

## 2024-09-19 PROCEDURE — 99212 OFFICE O/P EST SF 10 MIN: CPT

## 2024-09-19 PROCEDURE — 3074F SYST BP LT 130 MM HG: CPT | Performed by: FAMILY MEDICINE

## 2024-09-19 PROCEDURE — 3078F DIAST BP <80 MM HG: CPT | Performed by: FAMILY MEDICINE

## 2024-09-19 PROCEDURE — 99204 OFFICE O/P NEW MOD 45 MIN: CPT | Performed by: FAMILY MEDICINE

## 2024-09-19 RX ORDER — ROSUVASTATIN CALCIUM 10 MG/1
10 TABLET, COATED ORAL DAILY
Qty: 100 TABLET | Refills: 3 | Status: SHIPPED | OUTPATIENT
Start: 2024-09-19

## 2024-09-19 ASSESSMENT — ENCOUNTER SYMPTOMS
PND: 0
SPUTUM PRODUCTION: 0
ORTHOPNEA: 0
FEVER: 0
COUGH: 0
SHORTNESS OF BREATH: 0
WHEEZING: 0
HEMOPTYSIS: 0
CLAUDICATION: 0
CHILLS: 0
PALPITATIONS: 0

## 2024-09-19 ASSESSMENT — FIBROSIS 4 INDEX: FIB4 SCORE: 1.05

## 2024-09-19 NOTE — PROGRESS NOTES
INITIAL FAMILY LIPID CLINIC VISIT   09/19/24     Alphonso Escobedo has been referred for evaluation and mgmt of dyslipidemia  Referral Source: Leelee Jaffe M.D.   Est 9/2024    Subjective    Initial visit history: seen by PCP 8/29/24 and noted to have persistent uncontrolled mixed HLD.  Started on fenofibrate by PCP.  Reports no prior acute pancreatitis or other ascvd.  No major etoh intake.      PERTINENT HLD PMHX  Age at Initial Diagnosis of Dyslipidemia: 20s    Baseline Lipids Prior to Treatment:    Latest Reference Range & Units 03/08/22 06:42   Cholesterol,Tot 100 - 199 mg/dL 266 (H)   Triglycerides 0 - 149 mg/dL 557 (H)   HDL >=40 mg/dL 30 !   LDL <100 mg/dL see below      Latest Reference Range & Units 06/24/21 06:35 03/08/22 06:42 03/01/23 06:20 07/30/24 06:08   LDL <100 mg/dL 192 (H) see below 124 (H) see below     History of ASCVD: None   Other Established (non-atherosclerotic) Vascular Disease: None  Secondary contributors/causes of dyslipidemia:  Endocrine/Hypothyroidism: BMI 27,  MetS= insulin resistance   Liver disease: none reported   Renal disease/nephrotic syndrome:  none reported  Dietary-induced (ketogenic, lean mass hyper-responder)? no  Medications: None  Previously Attempted Interventions for Lipids - including outcome  Statin: lipitor     Outcome: no side effects   Non-Statin: niacin  Outcome: suboptimal response and not applicable    CURRENT MED MGMT  Current Lipid Lowering Meds:   Statin: None  Non-Statin: fenofibrate 145mg daily   Supplements: None  Current adverse drug reactions/side effects? no  Adherence? Yes     LIFESTYLE MGMT  Change in weight:  reduced 13-15lb over 6+ months   Exercise habits: minimal exercise  Dietary patterns: initiating healthy choices   Etoh: see sochx  Barriers to care/SDOH: none  Tobacco:  reports that he has never smoked. He has quit using smokeless tobacco.  His smokeless tobacco use included chew.     OTHER CV RISK FACTORS:  HTN: no prior dx or meds    Dysglycemia: yes, preDM  Antithrombotic tx: no       Patient Active Problem List   Diagnosis    Fatigue    Enlarged prostate    Seasonal allergies    Mixed dyslipidemia    Impaired fasting blood sugar    Low testosterone      has a past surgical history that includes umbilical hernia repair (2008) and craniotomy ().  Current Outpatient Medications on File Prior to Visit   Medication Sig Dispense Refill    fenofibrate (TRICOR) 145 MG Tab Take 1 Tablet by mouth every day. 90 Tablet 3    albuterol 108 (90 Base) MCG/ACT Aero Soln inhalation aerosol Inhale 2 Puffs every 6 hours as needed for Shortness of Breath. 8 g 2    NON SPECIFIED Vitamin d  Nugenix total t for low testosterone  Otc supplementat with selenium for BPH  Otc supplement with citrus bergamot for cholesterol       No current facility-administered medications on file prior to visit.      No Known Allergies   Family History   Problem Relation Age of Onset    Heart Disease Mother 54        pacemaker,     Hyperlipidemia Mother     Valvular heart disease Mother     Other Father 27        suicide    Hyperlipidemia Sister     Cancer Maternal Uncle         prostate    Other Paternal Uncle         hepatitis    Heart Disease Maternal Grandmother     Heart Attack Maternal Grandmother 45        angioplasty    Lung Cancer Maternal Grandmother 83        tobacco    Cancer Paternal Grandmother     Hyperlipidemia Daughter     Cancer Other         prostate cancer in cousins and dad's uncle    Ovarian Cancer Neg Hx     Tubal Cancer Neg Hx     Peritoneal Cancer Neg Hx     Colorectal Cancer Neg Hx     Breast Cancer Neg Hx      Social History     Tobacco Use    Smoking status: Never    Smokeless tobacco: Former     Types: Chew    Tobacco comments:     Chewed age 13 to 41   Vaping Use    Vaping status: Every Day    Substances: Nicotine   Substance Use Topics    Alcohol use: Yes     Comment: occasional    Drug use: No     Review of Systems   Constitutional:   "Negative for chills, fever and malaise/fatigue.   Respiratory:  Negative for cough, hemoptysis, sputum production, shortness of breath and wheezing.    Cardiovascular:  Negative for chest pain, palpitations, orthopnea, claudication, leg swelling and PND.         Objective    Vitals:    09/19/24 1411   BP: 106/66   BP Location: Left arm   Patient Position: Sitting   BP Cuff Size: Adult   Pulse: 63   Weight: 79.4 kg (175 lb)   Height: 1.702 m (5' 7\")     BP Readings from Last 5 Encounters:   09/19/24 106/66   08/29/24 110/62   11/23/23 100/62   07/12/23 126/78   04/03/23 110/70     BMI Readings from Last 1 Encounters:   09/19/24 27.41 kg/m²     Wt Readings from Last 3 Encounters:   09/19/24 79.4 kg (175 lb)   08/29/24 82.7 kg (182 lb 6.4 oz)   11/23/23 85.3 kg (188 lb)     Physical Exam  Constitutional:       General: He is not in acute distress.     Appearance: Normal appearance. He is not ill-appearing.   HENT:      Head: Normocephalic and atraumatic.   Eyes:      Conjunctiva/sclera: Conjunctivae normal.      Comments: No corneal arcus   Neck:      Vascular: No carotid bruit.   Cardiovascular:      Rate and Rhythm: Normal rate and regular rhythm.      Pulses:           Carotid pulses are 2+ on the right side and 2+ on the left side.       Radial pulses are 2+ on the right side and 2+ on the left side.        Popliteal pulses are 2+ on the right side and 2+ on the left side.        Dorsalis pedis pulses are 2+ on the right side and 2+ on the left side.      Heart sounds: No murmur heard.  Pulmonary:      Effort: Pulmonary effort is normal.      Breath sounds: Normal breath sounds.   Musculoskeletal:      Cervical back: Normal range of motion.      Right lower leg: No edema.      Left lower leg: No edema.   Feet:      Comments: No achilles thickening or nodularity  Skin:     General: Skin is warm and dry.      Coloration: Skin is not cyanotic or mottled.      Findings: No wound.      Comments: No tendon xanthomas " "  Neurological:      General: No focal deficit present.      Mental Status: He is alert.   Psychiatric:         Mood and Affect: Mood normal.       DATA REVIEW:  Most Recent Lipid Panel:   Lab Results   Component Value Date/Time    CHOLSTRLTOT 239 (H) 2024 06:08 AM    LDL see below 2024 06:08 AM    HDL 29 (A) 2024 06:08 AM    TRIGLYCERIDE 496 (H) 2024 06:08 AM     Lab Results   Component Value Date/Time    LDL see below 2024 06:08 AM     (H) 2023 06:20 AM    LDL see below 2022 06:42 AM     (H) 2021 06:35 AM      No results found for: \"LIPOPROTA\"   No results found for: \"APOB\"   No results found for: \"CRPHIGHSEN\"    Other Pertinent Blood Work:   Lab Results   Component Value Date    SODIUM 140 2024    POTASSIUM 4.7 2024    CHLORIDE 106 2024    CO2 23 2024    ANION 11.0 2024    GLUCOSE 105 (H) 2024    BUN 21 2024    CREATININE 1.04 2024    CALCIUM 9.7 2024    ASTSGOT 23 2024    ALTSGPT 32 2024    ALKPHOSPHAT 64 2024    TBILIRUBIN 0.5 2024    ALBUMIN 4.5 2024    AGRATIO 1.7 2024       VASCULAR IMAGIN/2004  US abd   The abdominal aorta is normal in caliber       CV PROCEDURES: none          ASSESSMENT AND PLAN  1. Hypertriglyceridemia  APOLIPOPROTEIN B    Comp Metabolic Panel    CRP HIGH SENSITIVE (CARDIAC)    Lipid Profile    APO E GENOTYPING,CARDIO RISK    INSULIN FASTING      2. FCHL (familial combined hyperlipidemia)  APOLIPOPROTEIN B    Comp Metabolic Panel    CRP HIGH SENSITIVE (CARDIAC)    Lipid Profile    rosuvastatin (CRESTOR) 10 MG Tab      3. Overweight (BMI 25.0-29.9)        4. Prediabetes  INSULIN FASTING      5. Metabolic syndrome  INSULIN FASTING      6. Other specified personal risk factors, not elsewhere classified  CT-CARDIAC SCORING        Patient Type: Primary Prevention    Major ASCVD events: None      Other established Vascular Disease: " "None    Evidence of genetic dyslipidemia: Yes   -baseline LDL-C >190   - basleine TG >500 w/o major secondary factors     Presumed dx:  FCHL based upon high LDL-C/high TG with hyperapoB/hyperTG phenotype (type IIb)  - most commonly inherited DLD (~1 in 200), generally polygenic often with significant famhx of DLD and premature ASCVD.  significant increased risk for premature CVD (>20% develop CHD <59yo) and requires aggressive lipid lowering interventions including diet and meds     FH genotyping: NO    ApoE genotyping: YES    Secondary causes/contributors: yes, MetS presumed insulin resistance     ACC/AHA Indication for Statin Therapy:  Primary Prevention - 40-74yo, LDLc >70, <190 w/o DM  - mainly due to TG >500    ASCVD risk calculations  The 10-year ASCVD risk score (Germán PEACE, et al., 2019) is: 7%,  5 - 7.5% \"borderline risk\" but likely inaccurate due to LDL-C >190 baseline     Other Significant Risk Markers:  High-risk conditions: N/A  Risk-enhancers: Persistently elevated LDL-C >159, Metabolic syndrome , and Persistently elevated trigs >174  Lipoprotein(a): Ordered this visit    - update additional biomarkers   - consider CACS     Goal LDL-C and ApolipoproteinB/non-HDL-C:  At goal 9/2024?   Primary: TG <500 - yes   Secondary: apoB <90 - n/a     Non-HDL-C <130 - no     Direct LDL-C <100 - no    TG <150 - no    LIFESTYLE INTERVENTIONS  TOBACCO:  reports that he has never smoked. He has quit using smokeless tobacco.  His smokeless tobacco use included chew.   - continued complete avoidance of all tobacco products   PHYSICAL ACTIVITY: at least 150 min per week of moderate intensity  NUTRITION: Triglyceride-lowering diet with reduced fat calories, reduced simple carbohydrates, reduce/avoid alcohol, Weight reduction - reduce caloric intake by 300 - 500 kcal/day to achieve 1-2lb weight loss per week , and - handout provided   ETOH: complete abstinence recommended  WT MGMT: focus on 5-7% reduction over time will have " impact on TGs     LIPID-LOWERING MEDICATION MANAGEMENT:     Statin Therapy:   Start rosuvastatin 10mg daily     Non-Statin Meds:   ZETIA: add as next agent     TG therapies:  OMEGA-3 FAs: additional option  FIBRATE:  continue fenofibrate 145mg daily; consider transition to fenofibric acid if max out statin to reduce myalgia risks   ApoC3i: available in clinical research trials only at this time     PCSK9 Inhibitor strategy indications: Not currently indicated    Recommended supplements: None     APHERESIS: n/a     BLOOD PRESSURE MANAGEMENT  Office BP Goal ACC/AHA (2017) goal <130/80  Home BP at goal:  yes  Office BP at goal:  yes  24h ABPM:  not indicated  RDN candidate? N/a  Contributing factors: n/a   Plan:   - monitor annual office-based BP and/or intermittent at home BP, report >130/80    - continue healthy lifestyle  Medications: none      GLYCEMIC STATUS: Prediabetic, baseline on IFG With Metabolic syndrome - at least 3/5 components   Lab Results   Component Value Date    HBA1C 5.4 03/01/2023      - higher components equates to higher system-wide inflammation and ASCVD and T2D risk   - estimated 2-fold increase ASCVD events for both primary/secondary prevention   - estimated 4-6-fold increase in development of full-blown T2D  Plan:  - focus on healthy macromolecules choices and overall reduced kcal diet, such as Med diet approach  - focus on body weight reduction of 5-7% as weight loss goal   - consider metformin initiation up to 850mg BID if A1c 6.2+ in future to reduce T2D progression     ANTITHROMBOTIC THERAPY Not currently recommended    OTHER:  none     Studies CACS   Labs: as noted above  Follow-Up: RTC in 2 months    Abisai Bailey M.D. ABCL, board-certified Clinical Lipidologist   [facility]   [facility]   [phone]      CC:  MARSHALL Lezama Jasleen, M.D.

## 2024-09-21 PROBLEM — E78.49 FCHL (FAMILIAL COMBINED HYPERLIPIDEMIA): Status: ACTIVE | Noted: 2024-09-21

## 2024-09-21 PROBLEM — R73.03 PREDIABETES: Status: ACTIVE | Noted: 2024-09-21

## 2024-09-21 PROBLEM — E66.3 OVERWEIGHT (BMI 25.0-29.9): Status: ACTIVE | Noted: 2024-09-21

## 2024-09-21 PROBLEM — E88.810 METABOLIC SYNDROME: Status: ACTIVE | Noted: 2024-09-21

## 2024-09-21 PROBLEM — E78.1 HYPERTRIGLYCERIDEMIA: Status: ACTIVE | Noted: 2024-09-21

## 2024-09-23 ENCOUNTER — HOSPITAL ENCOUNTER (OUTPATIENT)
Dept: RADIOLOGY | Facility: MEDICAL CENTER | Age: 54
End: 2024-09-23
Attending: FAMILY MEDICINE
Payer: COMMERCIAL

## 2024-09-23 DIAGNOSIS — Z91.89 OTHER SPECIFIED PERSONAL RISK FACTORS, NOT ELSEWHERE CLASSIFIED: ICD-10-CM

## 2024-09-23 PROBLEM — N40.0 ENLARGED PROSTATE: Chronic | Status: ACTIVE | Noted: 2021-06-16

## 2024-09-23 PROBLEM — E78.2 MIXED DYSLIPIDEMIA: Chronic | Status: ACTIVE | Noted: 2021-07-13

## 2024-09-23 PROBLEM — R79.89 LOW TESTOSTERONE: Chronic | Status: ACTIVE | Noted: 2024-06-18

## 2024-09-23 PROBLEM — R73.03 PREDIABETES: Chronic | Status: ACTIVE | Noted: 2024-09-21

## 2024-09-23 PROBLEM — E78.49 FCHL (FAMILIAL COMBINED HYPERLIPIDEMIA): Chronic | Status: ACTIVE | Noted: 2024-09-21

## 2024-09-23 PROCEDURE — 4410556 CT-CARDIAC SCORING (SELF PAY ONLY)

## 2024-09-24 PROBLEM — R93.1 AGATSTON CAC SCORE, <100: Status: ACTIVE | Noted: 2024-09-24

## 2024-09-24 PROBLEM — I25.10 CORONARY ARTERY CALCIFICATION SEEN ON CT SCAN: Status: ACTIVE | Noted: 2024-09-24

## 2024-11-04 ENCOUNTER — HOSPITAL ENCOUNTER (OUTPATIENT)
Dept: LAB | Facility: MEDICAL CENTER | Age: 54
End: 2024-11-04
Attending: FAMILY MEDICINE
Payer: COMMERCIAL

## 2024-11-04 DIAGNOSIS — E78.49 FCHL (FAMILIAL COMBINED HYPERLIPIDEMIA): ICD-10-CM

## 2024-11-04 DIAGNOSIS — E88.810 METABOLIC SYNDROME: ICD-10-CM

## 2024-11-04 DIAGNOSIS — R73.03 PREDIABETES: ICD-10-CM

## 2024-11-04 DIAGNOSIS — E78.1 HYPERTRIGLYCERIDEMIA: ICD-10-CM

## 2024-11-04 PROCEDURE — 80053 COMPREHEN METABOLIC PANEL: CPT

## 2024-11-04 PROCEDURE — 83525 ASSAY OF INSULIN: CPT

## 2024-11-04 PROCEDURE — 86141 C-REACTIVE PROTEIN HS: CPT

## 2024-11-04 PROCEDURE — 36415 COLL VENOUS BLD VENIPUNCTURE: CPT

## 2024-11-04 PROCEDURE — 82172 ASSAY OF APOLIPOPROTEIN: CPT

## 2024-11-04 PROCEDURE — 81401 MOPATH PROCEDURE LEVEL 2: CPT

## 2024-11-04 PROCEDURE — 80061 LIPID PANEL: CPT

## 2024-11-05 LAB
ALBUMIN SERPL BCP-MCNC: 4.9 G/DL (ref 3.2–4.9)
ALBUMIN/GLOB SERPL: 2 G/DL
ALP SERPL-CCNC: 41 U/L (ref 30–99)
ALT SERPL-CCNC: 33 U/L (ref 2–50)
ANION GAP SERPL CALC-SCNC: 10 MMOL/L (ref 7–16)
AST SERPL-CCNC: 33 U/L (ref 12–45)
BILIRUB SERPL-MCNC: 0.5 MG/DL (ref 0.1–1.5)
BUN SERPL-MCNC: 19 MG/DL (ref 8–22)
CALCIUM ALBUM COR SERPL-MCNC: 9.7 MG/DL (ref 8.5–10.5)
CALCIUM SERPL-MCNC: 10.4 MG/DL (ref 8.5–10.5)
CHLORIDE SERPL-SCNC: 108 MMOL/L (ref 96–112)
CHOLEST SERPL-MCNC: 126 MG/DL (ref 100–199)
CO2 SERPL-SCNC: 26 MMOL/L (ref 20–33)
CREAT SERPL-MCNC: 1.12 MG/DL (ref 0.5–1.4)
CRP SERPL HS-MCNC: 0.2 MG/L (ref 0–3)
FASTING STATUS PATIENT QL REPORTED: NORMAL
GFR SERPLBLD CREATININE-BSD FMLA CKD-EPI: 78 ML/MIN/1.73 M 2
GLOBULIN SER CALC-MCNC: 2.4 G/DL (ref 1.9–3.5)
GLUCOSE SERPL-MCNC: 107 MG/DL (ref 65–99)
HDLC SERPL-MCNC: 37 MG/DL
LDLC SERPL CALC-MCNC: 68 MG/DL
POTASSIUM SERPL-SCNC: 4.6 MMOL/L (ref 3.6–5.5)
PROT SERPL-MCNC: 7.3 G/DL (ref 6–8.2)
SODIUM SERPL-SCNC: 144 MMOL/L (ref 135–145)
TRIGL SERPL-MCNC: 104 MG/DL (ref 0–149)

## 2024-11-06 LAB
APO B100 SERPL-MCNC: 76 MG/DL (ref 66–133)
INSULIN P FAST SERPL-ACNC: 11 UIU/ML (ref 3–25)

## 2024-11-08 LAB — TEST NAME 95000: ABNORMAL

## 2024-11-25 ENCOUNTER — OFFICE VISIT (OUTPATIENT)
Dept: VASCULAR LAB | Facility: MEDICAL CENTER | Age: 54
End: 2024-11-25
Attending: FAMILY MEDICINE
Payer: COMMERCIAL

## 2024-11-25 VITALS
BODY MASS INDEX: 25.9 KG/M2 | HEART RATE: 66 BPM | HEIGHT: 67 IN | DIASTOLIC BLOOD PRESSURE: 65 MMHG | SYSTOLIC BLOOD PRESSURE: 111 MMHG | WEIGHT: 165 LBS

## 2024-11-25 DIAGNOSIS — R73.03 PREDIABETES: ICD-10-CM

## 2024-11-25 DIAGNOSIS — I25.10 CORONARY ARTERY CALCIFICATION SEEN ON CT SCAN: ICD-10-CM

## 2024-11-25 DIAGNOSIS — E88.810 METABOLIC SYNDROME: ICD-10-CM

## 2024-11-25 DIAGNOSIS — R93.1 AGATSTON CAC SCORE, <100: ICD-10-CM

## 2024-11-25 DIAGNOSIS — E88.819 INSULIN RESISTANCE: Chronic | ICD-10-CM

## 2024-11-25 DIAGNOSIS — E78.49 FCHL (FAMILIAL COMBINED HYPERLIPIDEMIA): ICD-10-CM

## 2024-11-25 PROCEDURE — 99214 OFFICE O/P EST MOD 30 MIN: CPT | Performed by: FAMILY MEDICINE

## 2024-11-25 PROCEDURE — 3074F SYST BP LT 130 MM HG: CPT | Performed by: FAMILY MEDICINE

## 2024-11-25 PROCEDURE — 99212 OFFICE O/P EST SF 10 MIN: CPT

## 2024-11-25 PROCEDURE — 3078F DIAST BP <80 MM HG: CPT | Performed by: FAMILY MEDICINE

## 2024-11-25 ASSESSMENT — ENCOUNTER SYMPTOMS
PND: 0
WHEEZING: 0
COUGH: 0
CLAUDICATION: 0
SPUTUM PRODUCTION: 0
HEMOPTYSIS: 0
CHILLS: 0
FEVER: 0
ORTHOPNEA: 0
PALPITATIONS: 0
SHORTNESS OF BREATH: 0

## 2024-11-25 ASSESSMENT — FIBROSIS 4 INDEX: FIB4 SCORE: 1.49

## 2024-11-25 NOTE — PROGRESS NOTES
FOLLOW-UP [facility]   11/25/24     Alphonso Escobedo, ref for mgmt of dyslipidemia, est 9/2024  Referral Source: Leelee Jaffe M.D.     Subjective    Interval hx/concerns: last seen 9/2024.  Had interval labs, CACS.  Feeling well.    CACs 75,  = 80%ile,  10yr risk need to calc  Apob 76, insulin 11  Apo e3/e4    CURRENT MED MGMT  Current Lipid Lowering Meds:   Statin: None  Non-Statin: fenofibrate 145mg daily   Supplements: None  Current adverse drug reactions/side effects? no  Adherence? Yes     LIFESTYLE MGMT  Change in weight:  reduced 13-15lb over 6+ months   Exercise habits: minimal exercise - no changes   Dietary patterns: started beets, almond, limited red meat, Med style approach   Etoh: see sochx  Barriers to care/SDOH: none  Tobacco:  reports that he has never smoked. He has quit using smokeless tobacco.  His smokeless tobacco use included chew.     PERTINENT HLD PMHX  Initial visit history: seen by PCP 8/29/24 and noted to have persistent uncontrolled mixed HLD.  Started on fenofibrate by PCP.  Reports no prior acute pancreatitis or other ascvd.  No major etoh intake.    Age at Initial Diagnosis of Dyslipidemia: 20s    Baseline Lipids Prior to Treatment:    Latest Reference Range & Units 03/08/22 06:42   Cholesterol,Tot 100 - 199 mg/dL 266 (H)   Triglycerides 0 - 149 mg/dL 557 (H)   HDL >=40 mg/dL 30 !   LDL <100 mg/dL see below      Latest Reference Range & Units 06/24/21 06:35 03/08/22 06:42 03/01/23 06:20 07/30/24 06:08   LDL <100 mg/dL 192 (H) see below 124 (H) see below     History of ASCVD: None   Other Established Vascular Disease: None  Previously Attempted Interventions for Lipids - including outcome  Statin: lipitor   Outcome: no side effects   Non-Statin: niacin Outcome: suboptimal response and not applicable  OTHER CV RISK FACTORS:  HTN: no prior dx or meds   Dysglycemia: yes, preDM  Antithrombotic tx: no       Current Outpatient Medications on File Prior to Visit   Medication  "Sig Dispense Refill    rosuvastatin (CRESTOR) 10 MG Tab Take 1 Tablet by mouth every day. To lower cholesterol and heart disease risk 100 Tablet 3    fenofibrate (TRICOR) 145 MG Tab Take 1 Tablet by mouth every day. 90 Tablet 3    albuterol 108 (90 Base) MCG/ACT Aero Soln inhalation aerosol Inhale 2 Puffs every 6 hours as needed for Shortness of Breath. 8 g 2    NON SPECIFIED Vitamin d  Nugenix total t for low testosterone  Otc supplementat with selenium for BPH  Otc supplement with citrus bergamot for cholesterol       No current facility-administered medications on file prior to visit.      No Known Allergies     Social History     Tobacco Use    Smoking status: Never    Smokeless tobacco: Former     Types: Chew    Tobacco comments:     Chewed age 13 to 41   Vaping Use    Vaping status: Every Day    Substances: Nicotine   Substance Use Topics    Alcohol use: Yes     Comment: occasional    Drug use: No     Review of Systems   Constitutional:  Negative for chills, fever and malaise/fatigue.   Respiratory:  Negative for cough, hemoptysis, sputum production, shortness of breath and wheezing.    Cardiovascular:  Negative for chest pain, palpitations, orthopnea, claudication, leg swelling and PND.         Objective    Vitals:    11/25/24 1040   BP: 111/65   BP Location: Left arm   Patient Position: Sitting   BP Cuff Size: Adult   Pulse: 66   Weight: 74.8 kg (165 lb)   Height: 1.702 m (5' 7\")     BP Readings from Last 5 Encounters:   11/25/24 111/65   09/19/24 106/66   08/29/24 110/62   11/23/23 100/62   07/12/23 126/78     BMI Readings from Last 1 Encounters:   11/25/24 25.84 kg/m²     Wt Readings from Last 3 Encounters:   11/25/24 74.8 kg (165 lb)   09/19/24 79.4 kg (175 lb)   08/29/24 82.7 kg (182 lb 6.4 oz)     Physical Exam  Constitutional:       General: He is not in acute distress.     Appearance: Normal appearance. He is not ill-appearing.   HENT:      Head: Normocephalic and atraumatic.   Eyes:      " "Conjunctiva/sclera: Conjunctivae normal.      Comments: No corneal arcus   Neck:      Vascular: No carotid bruit.   Cardiovascular:      Rate and Rhythm: Normal rate and regular rhythm.      Pulses:           Carotid pulses are 2+ on the right side and 2+ on the left side.       Radial pulses are 2+ on the right side and 2+ on the left side.        Popliteal pulses are 2+ on the right side and 2+ on the left side.        Dorsalis pedis pulses are 2+ on the right side and 2+ on the left side.      Heart sounds: No murmur heard.  Pulmonary:      Effort: Pulmonary effort is normal.      Breath sounds: Normal breath sounds.   Musculoskeletal:      Cervical back: Normal range of motion.      Right lower leg: No edema.      Left lower leg: No edema.   Feet:      Comments: No achilles thickening or nodularity  Skin:     General: Skin is warm and dry.      Coloration: Skin is not cyanotic or mottled.      Findings: No wound.      Comments: No tendon xanthomas   Neurological:      General: No focal deficit present.      Mental Status: He is alert.   Psychiatric:         Mood and Affect: Mood normal.       DATA REVIEW:  Most Recent Lipid Panel:   Lab Results   Component Value Date/Time    CHOLSTRLTOT 126 11/04/2024 06:32 AM    LDL 68 11/04/2024 06:32 AM    HDL 37 (A) 11/04/2024 06:32 AM    TRIGLYCERIDE 104 11/04/2024 06:32 AM     Lab Results   Component Value Date/Time    LDL 68 11/04/2024 06:32 AM    LDL see below 07/30/2024 06:08 AM     (H) 03/01/2023 06:20 AM    LDL see below 03/08/2022 06:42 AM     (H) 06/24/2021 06:35 AM     No results found for: \"LIPOPROTA\"   Lab Results   Component Value Date/Time    APOB 76 11/04/2024 06:32 AM      Lab Results   Component Value Date/Time    CRPHIGHSEN 0.2 11/04/2024 06:32 AM       Other Pertinent Blood Work:   Lab Results   Component Value Date    SODIUM 144 11/04/2024    POTASSIUM 4.6 11/04/2024    CHLORIDE 108 11/04/2024    CO2 26 11/04/2024    ANION 10.0 11/04/2024    " GLUCOSE 107 (H) 2024    BUN 19 2024    CREATININE 1.12 2024    CALCIUM 10.4 2024    ASTSGOT 33 2024    ALTSGPT 33 2024    ALKPHOSPHAT 41 2024    TBILIRUBIN 0.5 2024    ALBUMIN 4.9 2024    AGRATIO 2.0 2024      Latest Reference Range & Units Most Recent   Insulin Fasting 3 - 25 uIU/mL 11  24 06:32     IMAGIN/2004  US abd   The abdominal aorta is normal in caliber     2024 CACS  Coronary calcification:  LMA - 0.0  LCX - 75.3  LAD - 0.0  RCA - 0.0   Total Calcium Score: 75.3   Percentile: Calcium score is above the 50th percentile for the patient's age and sex.   Other findings:  Heart: Normal size  Lungs: Clear  Mediastinum: Normal  Upper abdomen: Normal      PROCEDURES: none          ASSESSMENT AND PLAN  1. FCHL (familial combined hyperlipidemia)  APOLIPOPROTEIN B    Comp Metabolic Panel    Lipid Profile    HEMOGLOBIN A1C      2. Coronary artery calcification seen on CT scan        3. Agatston CAC score, <100        4. Prediabetes  APOLIPOPROTEIN B    Comp Metabolic Panel    Lipid Profile    HEMOGLOBIN A1C      5. Metabolic syndrome  APOLIPOPROTEIN B    Comp Metabolic Panel    Lipid Profile    HEMOGLOBIN A1C      6. Insulin resistance          Patient Type: Primary Prevention    Major ASCVD events: None      Other established Vascular Disease:     Asymptomatic, subclinical CAD (evidenced by CACS = 75 (80%ile for age/gender) in )  - no prior dx ASCVD events  - no LMA plaque noted   - no indications for functional testing w/o symptoms   - as reviewed with pt, ACC/AHA guidelines for chronic CAD mgmt () support GDMT alone as initial mgmt citing multiple RCTs (ISCHEMIA, COURAGE, MARGE 2D) demonstrating early revasc strategy plus GDMT did not improve MACE outcomes compared to GDMT alone  Plan:  - continue treatment plan as noted below  - report any new sx and consider functional testing if new symptoms over time      Evidence of genetic  "dyslipidemia: Yes   - baseline LDL-C >190   - basleine TG >500 w/o major secondary factors     Presumed dx:  FCHL based upon high LDL-C/high TG with hyperapoB/hyperTG phenotype (type IIb)  - most commonly inherited DLD (~1 in 200), generally polygenic often with significant famhx of DLD and premature ASCVD.  significant increased risk for premature CVD (>20% develop CHD <59yo) and requires aggressive lipid lowering interventions including diet and meds     FH genotyping: NO    ApoE genotyping: neg for FDBL (e2/e2)  HETEROZYGOUS APO e3/e4: Although this genotype is not associated with an increased risk for type III hyperlipoproteinemia, it may have some association with increased plasma cholesterol levels.     Secondary causes/contributors:  Endocrine/Hypothyroidism: BMI 27,  MetS= insulin resistance   Liver disease: none reported   Renal disease/nephrotic syndrome:  none reported  Dietary-induced (ketogenic, lean mass hyper-responder)? no  Medications: None    ACC/AHA Indication for Statin Therapy:  Primary Prevention - 40-76yo, LDLc >70, <190 w/o DM  - mainly due to TG >500    ASCVD risk calculations  The ASCVD Risk score (Germán PEACE, et al., 2019) failed to calculate.,  5 - 7.5% \"borderline risk\" but likely inaccurate due to LDL-C >190 baseline     PERKINS risk (2024 data)  80% for age/gender/ethnicity   10yr risk score   Using the Coronary Artery Calcium Score  10 Year risk of a CHD Event Coronary Age   Difference from Chronologic Age  5.3%    58    +4  Without Considering the Coronary Artery Calcium Score  10 Year risk of a CHD Event  Coronary Age  Difference from Chronologic Age  3.3%     47   -7      Other Significant Risk Markers:  High-risk conditions: N/A  Risk-enhancers: Persistently elevated LDL-C >159, Metabolic syndrome , and Persistently elevated trigs >174  Lipoprotein(a): Not available - not covered by [place] despite current strong recommendations guidelines     Goal LDL-C and " ApolipoproteinB/non-HDL-C:  At goal 11/2024?   Primary: TG <500 - yes   Secondary: apoB <90 - yes    Non-HDL-C <130 - yes    LDL-C <100 - yes    TG <150 - yes    LIFESTYLE INTERVENTIONS  TOBACCO:  reports that he has never smoked. He has quit using smokeless tobacco.  His smokeless tobacco use included chew.   - continued complete avoidance of all tobacco products   PHYSICAL ACTIVITY: at least 150 min per week of moderate intensity  NUTRITION: Triglyceride-lowering diet with reduced fat calories, reduced simple carbohydrates, reduce/avoid alcohol, Weight reduction - reduce caloric intake by 300 - 500 kcal/day to achieve 1-2lb weight loss per week , and - handout provided   ETOH: complete abstinence recommended  WT MGMT: focus on 5-7% reduction over time will have impact on TGs     LIPID-LOWERING MEDICATION MANAGEMENT:     Statin Therapy:   Continue rosuva 10mg daily     Non-Statin Meds:   ZETIA: add as next agent     TG therapies:  OMEGA-3 FAs: additional option  FIBRATE:  continue fenofibrate 145mg daily; consider transition to fenofibric acid if max out statin to reduce myalgia risks   ApoC3i: available in clinical research trials only at this time     PCSK9 Inhibitor strategy indications: Not currently indicated    Recommended supplements: None     APHERESIS: n/a     BLOOD PRESSURE MANAGEMENT  Office BP Goal ACC/AHA (2017) goal <130/80  Home BP at goal:  yes  Office BP at goal:  yes  24h ABPM:  not indicated  RDN candidate? N/a  Contributing factors: n/a   Plan:   - monitor annual office-based BP and/or intermittent at home BP, report >130/80    - continue healthy lifestyle  Medications: none      GLYCEMIC STATUS: Prediabetic, baseline on IFG With Metabolic syndrome - at least 3/5 components   Lab Results   Component Value Date    HBA1C 5.4 03/01/2023    HENRY-IR = 2.9 indicating insulin resistance baseline   - higher components equates to higher system-wide inflammation and ASCVD and T2D risk   - estimated 2-fold  increase ASCVD events for both primary/secondary prevention   - estimated 4-6-fold increase in development of full-blown T2D  Plan:  - focus on healthy macromolecules choices and overall reduced kcal diet, such as Med diet approach  - focus on body weight reduction of 5-7% as weight loss goal   - consider metformin initiation up to 850mg BID if A1c 6.2+ in future to reduce T2D progression     ANTITHROMBOTIC THERAPY Not currently recommended    OTHER:  none     STUDIES:  none   LABS: as noted above  Follow up in: RTC in 6 months     Abisai Bailey M.D.  [facility], board-certified Clinical Lipidologist   [facility]   [facility]   [phone]      CC:  MARSHALL Lezama Jasleen, M.D.

## 2025-05-09 ENCOUNTER — HOSPITAL ENCOUNTER (OUTPATIENT)
Dept: LAB | Facility: MEDICAL CENTER | Age: 55
End: 2025-05-09
Attending: FAMILY MEDICINE
Payer: COMMERCIAL

## 2025-05-09 DIAGNOSIS — E88.810 METABOLIC SYNDROME: ICD-10-CM

## 2025-05-09 DIAGNOSIS — E78.49 FCHL (FAMILIAL COMBINED HYPERLIPIDEMIA): ICD-10-CM

## 2025-05-09 DIAGNOSIS — R73.03 PREDIABETES: ICD-10-CM

## 2025-05-09 LAB
EST. AVERAGE GLUCOSE BLD GHB EST-MCNC: 111 MG/DL
HBA1C MFR BLD: 5.5 % (ref 4–5.6)

## 2025-05-09 PROCEDURE — 80053 COMPREHEN METABOLIC PANEL: CPT

## 2025-05-09 PROCEDURE — 36415 COLL VENOUS BLD VENIPUNCTURE: CPT

## 2025-05-09 PROCEDURE — 83036 HEMOGLOBIN GLYCOSYLATED A1C: CPT

## 2025-05-09 PROCEDURE — 80061 LIPID PANEL: CPT

## 2025-05-09 PROCEDURE — 82172 ASSAY OF APOLIPOPROTEIN: CPT

## 2025-05-10 LAB
ALBUMIN SERPL BCP-MCNC: 4.5 G/DL (ref 3.2–4.9)
ALBUMIN/GLOB SERPL: 1.9 G/DL
ALP SERPL-CCNC: 30 U/L (ref 30–99)
ALT SERPL-CCNC: 27 U/L (ref 2–50)
ANION GAP SERPL CALC-SCNC: 7 MMOL/L (ref 7–16)
AST SERPL-CCNC: 27 U/L (ref 12–45)
BILIRUB SERPL-MCNC: 0.5 MG/DL (ref 0.1–1.5)
BUN SERPL-MCNC: 22 MG/DL (ref 8–22)
CALCIUM ALBUM COR SERPL-MCNC: 9.4 MG/DL (ref 8.5–10.5)
CALCIUM SERPL-MCNC: 9.8 MG/DL (ref 8.5–10.5)
CHLORIDE SERPL-SCNC: 110 MMOL/L (ref 96–112)
CHOLEST SERPL-MCNC: 126 MG/DL (ref 100–199)
CO2 SERPL-SCNC: 27 MMOL/L (ref 20–33)
CREAT SERPL-MCNC: 1.15 MG/DL (ref 0.5–1.4)
FASTING STATUS PATIENT QL REPORTED: NORMAL
GFR SERPLBLD CREATININE-BSD FMLA CKD-EPI: 76 ML/MIN/1.73 M 2
GLOBULIN SER CALC-MCNC: 2.4 G/DL (ref 1.9–3.5)
GLUCOSE SERPL-MCNC: 107 MG/DL (ref 65–99)
HDLC SERPL-MCNC: 41 MG/DL
LDLC SERPL CALC-MCNC: 68 MG/DL
POTASSIUM SERPL-SCNC: 4.9 MMOL/L (ref 3.6–5.5)
PROT SERPL-MCNC: 6.9 G/DL (ref 6–8.2)
SODIUM SERPL-SCNC: 144 MMOL/L (ref 135–145)
TRIGL SERPL-MCNC: 87 MG/DL (ref 0–149)

## 2025-05-11 LAB — APO B100 SERPL-MCNC: 64 MG/DL (ref 66–133)

## 2025-05-23 ENCOUNTER — TELEPHONE (OUTPATIENT)
Dept: VASCULAR LAB | Facility: MEDICAL CENTER | Age: 55
End: 2025-05-23
Payer: COMMERCIAL

## 2025-05-23 NOTE — TELEPHONE ENCOUNTER
.Established patient  Chart prep for upcoming appointment.    Any pending/incomplete orders from last visit? No, all orders completed.  Was patient called and reminded to complete pending orders? N/A orders complete  Were any records requested?  No    Referral up to date? Yes  Referral attached to appointment (renewals and New patients only)? N/A (established with up-to-date referral)  Virtual appointment? No

## 2025-05-27 ENCOUNTER — OFFICE VISIT (OUTPATIENT)
Dept: VASCULAR LAB | Facility: MEDICAL CENTER | Age: 55
End: 2025-05-27
Attending: FAMILY MEDICINE
Payer: COMMERCIAL

## 2025-05-27 VITALS
HEART RATE: 62 BPM | BODY MASS INDEX: 25.74 KG/M2 | HEIGHT: 67 IN | DIASTOLIC BLOOD PRESSURE: 66 MMHG | SYSTOLIC BLOOD PRESSURE: 112 MMHG | WEIGHT: 164 LBS

## 2025-05-27 DIAGNOSIS — E88.810 METABOLIC SYNDROME: ICD-10-CM

## 2025-05-27 DIAGNOSIS — E88.819 INSULIN RESISTANCE: ICD-10-CM

## 2025-05-27 DIAGNOSIS — I25.10 CORONARY ARTERY CALCIFICATION SEEN ON CT SCAN: ICD-10-CM

## 2025-05-27 DIAGNOSIS — R93.1 AGATSTON CAC SCORE, <100: ICD-10-CM

## 2025-05-27 DIAGNOSIS — R73.03 PREDIABETES: ICD-10-CM

## 2025-05-27 DIAGNOSIS — E78.49 FCHL (FAMILIAL COMBINED HYPERLIPIDEMIA): Primary | ICD-10-CM

## 2025-05-27 PROCEDURE — 3074F SYST BP LT 130 MM HG: CPT | Performed by: FAMILY MEDICINE

## 2025-05-27 PROCEDURE — 99213 OFFICE O/P EST LOW 20 MIN: CPT | Performed by: FAMILY MEDICINE

## 2025-05-27 PROCEDURE — 99212 OFFICE O/P EST SF 10 MIN: CPT

## 2025-05-27 PROCEDURE — 3078F DIAST BP <80 MM HG: CPT | Performed by: FAMILY MEDICINE

## 2025-05-27 ASSESSMENT — ENCOUNTER SYMPTOMS
PND: 0
CHILLS: 0
SHORTNESS OF BREATH: 0
FEVER: 0
WHEEZING: 0
PALPITATIONS: 0
COUGH: 0
CLAUDICATION: 0
ORTHOPNEA: 0
SPUTUM PRODUCTION: 0
HEMOPTYSIS: 0

## 2025-05-27 ASSESSMENT — FIBROSIS 4 INDEX: FIB4 SCORE: 1.34

## 2025-05-27 NOTE — PROGRESS NOTES
FOLLOW-UP [facility]ID [facility]   05/27/25     Alphonso Escobedo, ref for mgmt of dyslipidemia, est 9/2024  Referral Source: Leelee Jaffe M.D.     Subjective    Interval hx/concerns: last seen 11/2024.  Feeling well. No new concerns.    No other new sx.      DYSLIPIDEMIA:  CURRENT MED MGMT  Current Rx:   Statin: rosuva 10mg   Non-Statin: fenofibrate 145mg daily   Supplements: None  Side effects? no  Adherence? Yes   LIFESTYLE MGMT  Change in weight: reduced 13-15lb over 6+ months   Exercise habits: minimal exercise - no changes   Dietary patterns: started beets, almond, limited red meat, Med style approach   Etoh: see sochx  Barriers to care/SDOH: none  Tobacco:  reports that he has never smoked. He has quit using smokeless tobacco.  His smokeless tobacco use included chew.   PERTINENT HLD PMHX  Initial visit history: seen by PCP 8/29/24 and noted to have persistent uncontrolled mixed HLD.  Started on fenofibrate by PCP.  Reports no prior acute pancreatitis or other ascvd.  No major etoh intake.    Age at Initial Diagnosis of Dyslipidemia: 20s    Baseline Lipids Prior to Treatment:    Latest Reference Range & Units 03/08/22 06:42   Cholesterol,Tot 100 - 199 mg/dL 266 (H)   Triglycerides 0 - 149 mg/dL 557 (H)   HDL >=40 mg/dL 30 !   LDL <100 mg/dL see below      Latest Reference Range & Units 06/24/21 06:35 03/08/22 06:42 03/01/23 06:20 07/30/24 06:08   LDL <100 mg/dL 192 (H) see below 124 (H) see below     History of ASCVD: None   Other Established Vascular Disease: None  Previously Attempted Interventions for Lipids:  Statin: lipitor   Outcome: no side effects   Non-Statin: niacin Outcome: suboptimal response and not applicable    HTN: no prior dx or meds     Dysglycemia: yes, preDM    Antithrombotic tx: no       Current Outpatient Medications on File Prior to Visit   Medication Sig Dispense Refill    rosuvastatin (CRESTOR) 10 MG Tab Take 1 Tablet by mouth every day. To lower cholesterol and heart disease  "risk 100 Tablet 3    fenofibrate (TRICOR) 145 MG Tab Take 1 Tablet by mouth every day. 90 Tablet 3    albuterol 108 (90 Base) MCG/ACT Aero Soln inhalation aerosol Inhale 2 Puffs every 6 hours as needed for Shortness of Breath. 8 g 2    NON SPECIFIED Vitamin d  Nugenix total t for low testosterone  Otc supplementat with selenium for BPH  Otc supplement with citrus bergamot for cholesterol       No current facility-administered medications on file prior to visit.      No Known Allergies     Social History     Tobacco Use    Smoking status: Never    Smokeless tobacco: Former     Types: Chew    Tobacco comments:     Chewed age 13 to 41   Vaping Use    Vaping status: Every Day    Substances: Nicotine   Substance Use Topics    Alcohol use: Yes     Comment: occasional    Drug use: No     Review of Systems   Constitutional:  Negative for chills, fever and malaise/fatigue.   Respiratory:  Negative for cough, hemoptysis, sputum production, shortness of breath and wheezing.    Cardiovascular:  Negative for chest pain, palpitations, orthopnea, claudication, leg swelling and PND.         Objective    Vitals:    05/27/25 0839   BP: 112/66   BP Location: Left arm   Patient Position: Sitting   BP Cuff Size: Adult   Pulse: 62   Weight: 74.4 kg (164 lb)   Height: 1.702 m (5' 7\")     BP Readings from Last 5 Encounters:   05/27/25 112/66   11/25/24 111/65   09/19/24 106/66   08/29/24 110/62   11/23/23 100/62     BMI Readings from Last 1 Encounters:   05/27/25 25.69 kg/m²     Wt Readings from Last 3 Encounters:   05/27/25 74.4 kg (164 lb)   11/25/24 74.8 kg (165 lb)   09/19/24 79.4 kg (175 lb)     Physical Exam  Constitutional:       General: He is not in acute distress.     Appearance: Normal appearance. He is not ill-appearing.   HENT:      Head: Normocephalic and atraumatic.   Eyes:      Conjunctiva/sclera: Conjunctivae normal.      Comments: No corneal arcus   Neck:      Vascular: No carotid bruit.   Cardiovascular:      Rate and " "Rhythm: Normal rate and regular rhythm.      Pulses:           Carotid pulses are 2+ on the right side and 2+ on the left side.       Radial pulses are 2+ on the right side and 2+ on the left side.        Popliteal pulses are 2+ on the right side and 2+ on the left side.        Dorsalis pedis pulses are 2+ on the right side and 2+ on the left side.      Heart sounds: No murmur heard.  Pulmonary:      Effort: Pulmonary effort is normal.      Breath sounds: Normal breath sounds.   Musculoskeletal:      Cervical back: Normal range of motion.      Right lower leg: No edema.      Left lower leg: No edema.   Feet:      Comments: No achilles thickening or nodularity  Skin:     General: Skin is warm and dry.      Coloration: Skin is not cyanotic or mottled.      Findings: No wound.      Comments: No tendon xanthomas   Neurological:      General: No focal deficit present.      Mental Status: He is alert.   Psychiatric:         Mood and Affect: Mood normal.       DATA REVIEW:  Most Recent Lipid Panel:   Lab Results   Component Value Date/Time    CHOLSTRLTOT 126 05/09/2025 06:28 AM    LDL 68 05/09/2025 06:28 AM    HDL 41 05/09/2025 06:28 AM    TRIGLYCERIDE 87 05/09/2025 06:28 AM     Lab Results   Component Value Date/Time    LDL 68 05/09/2025 06:28 AM    LDL 68 11/04/2024 06:32 AM    LDL see below 07/30/2024 06:08 AM     (H) 03/01/2023 06:20 AM    LDL see below 03/08/2022 06:42 AM      Latest Reference Range & Units 06/24/21 06:35 03/08/22 06:42 03/01/23 06:20 07/30/24 06:08 11/04/24 06:32 05/09/25 06:28   Triglycerides 0 - 149 mg/dL 203 (H) 557 (H) 363 (H) 496 (H) 104 87     No results found for: \"LIPOPROTA\"   Lab Results   Component Value Date/Time    APOB 64 (L) 05/09/2025 06:28 AM    APOB 76 11/04/2024 06:32 AM      Lab Results   Component Value Date/Time    CRPHIGHSEN 0.2 11/04/2024 06:32 AM       Other Pertinent Blood Work:   Lab Results   Component Value Date    SODIUM 144 05/09/2025    POTASSIUM 4.9 05/09/2025 "    CHLORIDE 110 2025    CO2 27 2025    ANION 7.0 2025    GLUCOSE 107 (H) 2025    BUN 22 2025    CREATININE 1.15 2025    CALCIUM 9.8 2025    ASTSGOT 27 2025    ALTSGPT 27 2025    ALKPHOSPHAT 30 2025    TBILIRUBIN 0.5 2025    ALBUMIN 4.5 2025    AGRATIO 1.9 2025      Latest Reference Range & Units Most Recent   Insulin Fasting 3 - 25 uIU/mL 11  24 06:32     IMAGIN/2004  US abd   The abdominal aorta is normal in caliber     2024 CACS  Coronary calcification:  LMA - 0.0  LCX - 75.3  LAD - 0.0  RCA - 0.0   Total Calcium Score: 75.3   Percentile: Calcium score is above the 50th percentile for the patient's age and sex.   Other findings:  Heart: Normal size  Lungs: Clear  Mediastinum: Normal  Upper abdomen: Normal      PROCEDURES: none          ASSESSMENT AND PLAN  1. FCHL (familial combined hyperlipidemia)  APOLIPOPROTEIN B    Comp Metabolic Panel    Lipid Profile    Lipoprotein (a)      2. Coronary artery calcification seen on CT scan        3. Agatston CAC score, <100        4. Prediabetes        5. Insulin resistance        6. Metabolic syndrome          Patient Type: Primary Prevention    Major ASCVD events: None      Other established Vascular Disease:     1) Asymptomatic, subclinical CAD (evidenced by CACS = 75 (80%ile for age/gender) in )  - no prior dx ASCVD events  - no LMA plaque noted   - no indications for functional testing w/o symptoms   - as reviewed with pt, ACC/AHA guidelines for chronic CAD mgmt () support GDMT alone as initial mgmt citing multiple RCTs (ISCHEMIA, COURAGE, MARGE 2D) demonstrating early revasc strategy plus GDMT did not improve MACE outcomes compared to GDMT alone  Plan:  - continue treatment plan as noted below  - report any new sx and consider functional testing if new symptoms over time      Evidence of genetic dyslipidemia: Yes   - baseline LDL-C >190   - basleine TG >500 w/o major  "secondary factors     Presumed dx:  FCHL based upon high LDL-C/high TG with hyperapoB/hyperTG phenotype (type IIb)  - most commonly inherited DLD (~1 in 200), generally polygenic often with significant famhx of DLD and premature ASCVD.  significant increased risk for premature CVD (>20% develop CHD <59yo) and requires aggressive lipid lowering interventions including diet and meds     FH genotyping: NO    APOE genotyping: HETEROZYGOUS APO e3/e4: Although this genotype is not associated with an increased risk for type III hyperlipoproteinemia, it may have some association with increased plasma cholesterol levels.     Secondary causes/contributors:  Endocrine/Hypothyroidism: BMI 27,  MetS= insulin resistance     ACC/AHA Indication for Statin Therapy:  Primary Prevention - 40-74yo, LDLc >70, <190 w/o DM  - mainly due to TG >500    ASCVD risk calculations  The ASCVD Risk score (Germán PEACE, et al., 2019) failed to calculate.,  5 - 7.5% \"borderline risk\" but likely inaccurate due to LDL-C >190 baseline     PERKINS risk (2024 data)  80% for age/gender/ethnicity   10yr risk score   Using the Coronary Artery Calcium Score  10 Year risk of a CHD Event Coronary Age   Diff from  chrono age  5.3%    57    +4  Without Considering the Coronary Artery Calcium Score  10 Year risk of a CHD Event  Coronary Age  Diff from Chrono Age  3.3%     46   -7    Other Significant Risk Markers:  High-risk conditions: N/A  Risk-enhancers: Persistently elevated LDL-C >159, Metabolic syndrome , and Persistently elevated trigs >174  Lipoprotein(a): Not available - not covered by [employer] despite current strong recommendations guidelines     LIPID TARGETS / GOALS:   At goal as of 5/2025?   Primary (panc risk reduction):  TG <500 - MET  Secondary (ascvd risk reduction): apoB <90- MET       Non-HDL-C <130 - MET       Direct LDL-C <100 - MET  Tertiary:     TG <150 - MET     LIFESTYLE INTERVENTIONS  TOBACCO:  reports that he has never smoked. He has quit using " "smokeless tobacco.  His smokeless tobacco use included chew.   - continued complete avoidance of all tobacco products   PHYSICAL ACTIVITY: at least 150 min per week of moderate intensity  NUTRITION: Triglyceride-lowering diet with reduced fat calories, reduced simple carbohydrates, reduce/avoid alcohol, Weight reduction - reduce caloric intake by 300 - 500 kcal/day to achieve 1-2lb weight loss per week , and - handout provided   ETOH: complete abstinence recommended  WT MGMT: focus on 5-7% reduction over time will have impact on TGs     LIPID-LOWERING MEDICATION MANAGEMENT:     Statin Therapy:   Continue rosuva 10mg daily     Non-Statin Meds:   ZETIA: add as next agent     TG therapies:  OMEGA-3 FAs: additional option  FIBRATE:  continue fenofibrate 145mg daily, transition to fenofibric 135mg if max out statin in future (FDA approved for use with max potency statin)  ApoC3i: n/a     PCSK9 Inhibitor strategy indications: Not currently indicated    Recommended supplements: None     APHERESIS: n/a     BLOOD PRESSURE MANAGEMENT  Office BP goal per ACC/AHA <130/80   Home BP at goal:  yes  Office BP at goal:  yes  Plan:   - monitor annual office-based BP and/or intermittent at home BP, report >130/80    - continue healthy lifestyle  Medications: none      GLYCEMIC STATUS: Prediabetic, baseline on IFG with prior MetS   HENRY-IR = 2.9 indicating insulin resistance baseline   Lab Results   Component Value Date/Time    HBA1C 5.5 05/09/2025 06:28 AM    HBA1C 5.4 03/01/2023 06:20 AM     No results found for: \"MICROALBCALC\", \"MALBCRT\", \"MALBEXCR\", \"BIJRXF70\", \"MICROALBUR\", \"MICRALB\", \"UMICROALBUM\", \"MICROALBTIM\"   - higher components equates to higher system-wide inflammation and ASCVD and T2D risk   - estimated 2-fold increase ASCVD events for both primary/secondary prevention   - estimated 4-6-fold increase in development of full-blown T2D  Plan:  - focus on healthy macromolecules choices and overall reduced kcal diet, such as " Med diet approach  - focus on body weight reduction of 5-7% as weight loss goal   - consider metformin initiation up to 850mg BID if A1c 6.2+ in future to reduce T2D progression     ANTITHROMBOTIC THERAPY Not currently recommended    OTHER:  none     STUDIES:  none   FOLLOW-UP: 12 months     Abisai Bailey M.D.  FLAKITA, board-certified Clinical Lipidologist   [facility]   [facility]   [phone]

## 2025-06-01 SDOH — ECONOMIC STABILITY: INCOME INSECURITY: HOW HARD IS IT FOR YOU TO PAY FOR THE VERY BASICS LIKE FOOD, HOUSING, MEDICAL CARE, AND HEATING?: NOT HARD AT ALL

## 2025-06-01 SDOH — HEALTH STABILITY: PHYSICAL HEALTH: ON AVERAGE, HOW MANY DAYS PER WEEK DO YOU ENGAGE IN MODERATE TO STRENUOUS EXERCISE (LIKE A BRISK WALK)?: 5 DAYS

## 2025-06-01 SDOH — ECONOMIC STABILITY: FOOD INSECURITY: WITHIN THE PAST 12 MONTHS, YOU WORRIED THAT YOUR FOOD WOULD RUN OUT BEFORE YOU GOT MONEY TO BUY MORE.: NEVER TRUE

## 2025-06-01 SDOH — ECONOMIC STABILITY: INCOME INSECURITY: IN THE LAST 12 MONTHS, WAS THERE A TIME WHEN YOU WERE NOT ABLE TO PAY THE MORTGAGE OR RENT ON TIME?: NO

## 2025-06-01 SDOH — HEALTH STABILITY: PHYSICAL HEALTH: ON AVERAGE, HOW MANY MINUTES DO YOU ENGAGE IN EXERCISE AT THIS LEVEL?: 20 MIN

## 2025-06-01 SDOH — ECONOMIC STABILITY: TRANSPORTATION INSECURITY
IN THE PAST 12 MONTHS, HAS LACK OF RELIABLE TRANSPORTATION KEPT YOU FROM MEDICAL APPOINTMENTS, MEETINGS, WORK OR FROM GETTING THINGS NEEDED FOR DAILY LIVING?: NO

## 2025-06-01 SDOH — ECONOMIC STABILITY: TRANSPORTATION INSECURITY
IN THE PAST 12 MONTHS, HAS THE LACK OF TRANSPORTATION KEPT YOU FROM MEDICAL APPOINTMENTS OR FROM GETTING MEDICATIONS?: NO

## 2025-06-01 SDOH — ECONOMIC STABILITY: FOOD INSECURITY: WITHIN THE PAST 12 MONTHS, THE FOOD YOU BOUGHT JUST DIDN'T LAST AND YOU DIDN'T HAVE MONEY TO GET MORE.: PATIENT DECLINED

## 2025-06-01 SDOH — HEALTH STABILITY: MENTAL HEALTH
STRESS IS WHEN SOMEONE FEELS TENSE, NERVOUS, ANXIOUS, OR CAN'T SLEEP AT NIGHT BECAUSE THEIR MIND IS TROUBLED. HOW STRESSED ARE YOU?: TO SOME EXTENT

## 2025-06-01 SDOH — ECONOMIC STABILITY: HOUSING INSECURITY
IN THE LAST 12 MONTHS, WAS THERE A TIME WHEN YOU DID NOT HAVE A STEADY PLACE TO SLEEP OR SLEPT IN A SHELTER (INCLUDING NOW)?: NO

## 2025-06-01 SDOH — ECONOMIC STABILITY: TRANSPORTATION INSECURITY
IN THE PAST 12 MONTHS, HAS LACK OF TRANSPORTATION KEPT YOU FROM MEETINGS, WORK, OR FROM GETTING THINGS NEEDED FOR DAILY LIVING?: NO

## 2025-06-01 ASSESSMENT — LIFESTYLE VARIABLES
HOW OFTEN DO YOU HAVE A DRINK CONTAINING ALCOHOL: MONTHLY OR LESS
HOW OFTEN DO YOU HAVE SIX OR MORE DRINKS ON ONE OCCASION: NEVER
AUDIT-C TOTAL SCORE: 1
SKIP TO QUESTIONS 9-10: 1
HOW MANY STANDARD DRINKS CONTAINING ALCOHOL DO YOU HAVE ON A TYPICAL DAY: 1 OR 2

## 2025-06-01 ASSESSMENT — SOCIAL DETERMINANTS OF HEALTH (SDOH)
DO YOU BELONG TO ANY CLUBS OR ORGANIZATIONS SUCH AS CHURCH GROUPS UNIONS, FRATERNAL OR ATHLETIC GROUPS, OR SCHOOL GROUPS?: PATIENT DECLINED
IN A TYPICAL WEEK, HOW MANY TIMES DO YOU TALK ON THE PHONE WITH FAMILY, FRIENDS, OR NEIGHBORS?: MORE THAN THREE TIMES A WEEK
HOW OFTEN DO YOU HAVE A DRINK CONTAINING ALCOHOL: MONTHLY OR LESS
WITHIN THE PAST 12 MONTHS, YOU WORRIED THAT YOUR FOOD WOULD RUN OUT BEFORE YOU GOT THE MONEY TO BUY MORE: NEVER TRUE
HOW OFTEN DO YOU ATTENT MEETINGS OF THE CLUB OR ORGANIZATION YOU BELONG TO?: PATIENT DECLINED
HOW MANY DRINKS CONTAINING ALCOHOL DO YOU HAVE ON A TYPICAL DAY WHEN YOU ARE DRINKING: 1 OR 2
HOW OFTEN DO YOU ATTENT MEETINGS OF THE CLUB OR ORGANIZATION YOU BELONG TO?: PATIENT DECLINED
IN A TYPICAL WEEK, HOW MANY TIMES DO YOU TALK ON THE PHONE WITH FAMILY, FRIENDS, OR NEIGHBORS?: MORE THAN THREE TIMES A WEEK
DO YOU BELONG TO ANY CLUBS OR ORGANIZATIONS SUCH AS CHURCH GROUPS UNIONS, FRATERNAL OR ATHLETIC GROUPS, OR SCHOOL GROUPS?: PATIENT DECLINED
IN THE PAST 12 MONTHS, HAS THE ELECTRIC, GAS, OIL, OR WATER COMPANY THREATENED TO SHUT OFF SERVICE IN YOUR HOME?: NO
HOW OFTEN DO YOU ATTEND CHURCH OR RELIGIOUS SERVICES?: PATIENT DECLINED
HOW HARD IS IT FOR YOU TO PAY FOR THE VERY BASICS LIKE FOOD, HOUSING, MEDICAL CARE, AND HEATING?: NOT HARD AT ALL
HOW OFTEN DO YOU GET TOGETHER WITH FRIENDS OR RELATIVES?: PATIENT DECLINED
HOW OFTEN DO YOU GET TOGETHER WITH FRIENDS OR RELATIVES?: PATIENT DECLINED
HOW OFTEN DO YOU HAVE SIX OR MORE DRINKS ON ONE OCCASION: NEVER
HOW OFTEN DO YOU ATTEND CHURCH OR RELIGIOUS SERVICES?: PATIENT DECLINED

## 2025-06-04 ENCOUNTER — OFFICE VISIT (OUTPATIENT)
Dept: MEDICAL GROUP | Facility: PHYSICIAN GROUP | Age: 55
End: 2025-06-04
Payer: COMMERCIAL

## 2025-06-04 VITALS
TEMPERATURE: 98 F | WEIGHT: 164.68 LBS | DIASTOLIC BLOOD PRESSURE: 58 MMHG | HEART RATE: 59 BPM | SYSTOLIC BLOOD PRESSURE: 108 MMHG | OXYGEN SATURATION: 97 % | BODY MASS INDEX: 25.85 KG/M2 | HEIGHT: 67 IN

## 2025-06-04 DIAGNOSIS — N40.0 ENLARGED PROSTATE: Chronic | ICD-10-CM

## 2025-06-04 DIAGNOSIS — Z00.00 WELLNESS EXAMINATION: ICD-10-CM

## 2025-06-04 DIAGNOSIS — Z23 NEED FOR VACCINATION: Primary | ICD-10-CM

## 2025-06-04 DIAGNOSIS — E78.49 FCHL (FAMILIAL COMBINED HYPERLIPIDEMIA): ICD-10-CM

## 2025-06-04 DIAGNOSIS — R79.89 LOW TESTOSTERONE: Chronic | ICD-10-CM

## 2025-06-04 DIAGNOSIS — Z12.5 PROSTATE CANCER SCREENING: ICD-10-CM

## 2025-06-04 DIAGNOSIS — H61.22 IMPACTED CERUMEN OF LEFT EAR: ICD-10-CM

## 2025-06-04 DIAGNOSIS — R21 RASH: ICD-10-CM

## 2025-06-04 PROCEDURE — 3078F DIAST BP <80 MM HG: CPT | Performed by: STUDENT IN AN ORGANIZED HEALTH CARE EDUCATION/TRAINING PROGRAM

## 2025-06-04 PROCEDURE — 90471 IMMUNIZATION ADMIN: CPT | Performed by: STUDENT IN AN ORGANIZED HEALTH CARE EDUCATION/TRAINING PROGRAM

## 2025-06-04 PROCEDURE — 90677 PCV20 VACCINE IM: CPT | Performed by: STUDENT IN AN ORGANIZED HEALTH CARE EDUCATION/TRAINING PROGRAM

## 2025-06-04 PROCEDURE — 90472 IMMUNIZATION ADMIN EACH ADD: CPT | Performed by: STUDENT IN AN ORGANIZED HEALTH CARE EDUCATION/TRAINING PROGRAM

## 2025-06-04 PROCEDURE — 3074F SYST BP LT 130 MM HG: CPT | Performed by: STUDENT IN AN ORGANIZED HEALTH CARE EDUCATION/TRAINING PROGRAM

## 2025-06-04 PROCEDURE — 99396 PREV VISIT EST AGE 40-64: CPT | Mod: 25 | Performed by: STUDENT IN AN ORGANIZED HEALTH CARE EDUCATION/TRAINING PROGRAM

## 2025-06-04 PROCEDURE — 99213 OFFICE O/P EST LOW 20 MIN: CPT | Mod: 25 | Performed by: STUDENT IN AN ORGANIZED HEALTH CARE EDUCATION/TRAINING PROGRAM

## 2025-06-04 PROCEDURE — 90715 TDAP VACCINE 7 YRS/> IM: CPT | Performed by: STUDENT IN AN ORGANIZED HEALTH CARE EDUCATION/TRAINING PROGRAM

## 2025-06-04 RX ORDER — TRIAMCINOLONE ACETONIDE 1 MG/G
1 CREAM TOPICAL 2 TIMES DAILY
Qty: 45 G | Refills: 0 | Status: SHIPPED | OUTPATIENT
Start: 2025-06-04

## 2025-06-04 RX ORDER — FENOFIBRATE 145 MG/1
145 TABLET, FILM COATED ORAL DAILY
Qty: 90 TABLET | Refills: 3 | Status: SHIPPED | OUTPATIENT
Start: 2025-06-04

## 2025-06-04 RX ORDER — ROSUVASTATIN CALCIUM 10 MG/1
10 TABLET, COATED ORAL DAILY
Qty: 90 TABLET | Refills: 3 | Status: SHIPPED | OUTPATIENT
Start: 2025-06-04

## 2025-06-04 ASSESSMENT — PATIENT HEALTH QUESTIONNAIRE - PHQ9: CLINICAL INTERPRETATION OF PHQ2 SCORE: 0

## 2025-06-04 ASSESSMENT — FIBROSIS 4 INDEX: FIB4 SCORE: 1.34

## 2025-06-04 NOTE — PROGRESS NOTES
Subjective:     CC:   Chief Complaint   Patient presents with    Annual Exam     Bumps on Rside   3-4 months    Medication Refill     Crestor         History of Present Illness  The patient presents for an annual exam and reports experiencing pruritic papules on his abdomen for the past 3 to 4 months. He has been managing these with a topical application of cortisone and aloe vera.    He is seeking to have his testosterone and PSA levels evaluated, as they are typically assessed annually. He discontinued AndroGel due to insurance coverage issues and subsequently experienced adverse effects, including irritability and headaches, with testosterone injections. He has not used AndroGel for several years and is currently taking Nugenix Total-T.    He recently refilled his triglyceride medication and had a consultation with his cardiologist last week, during which he was informed that his cholesterol levels were within normal limits. His A1c was also normal. He continues to see his dentist and eye doctor. He has discontinued his urology consultations due to dissatisfaction with the side effects of Flomax, which he has replaced with an over-the-counter supplement containing selenium.        Health Maintenance  Cholesterol Screening: July 2024 , , HDL 29, LDL not calculated  Diabetes Screening: July 2024   Aspirin Use: N/A   Diet / Execise: BMI 25.79  Smoking: denies  Substance Abuse: denies  Safe in relationship: yes  Dentist: follows up regularly  Ophthalmology: follows up regularly     Cancer screening  Colorectal Cancer Screening: colonoscopy Dec 2023, repeat 3  yrs  Prostate Cancer Screening/PSA: July 2024 PSA WNL     Screenings/Immunizations  HIV/Hep C screening: reports it was negative in the past  Tetanus: given today  Shingles: advised to get at pharmacy  PCV20: given today      He  has no past medical history of ASTHMA, Diabetes, or IBD (inflammatory bowel disease).  He  has a past surgical  history that includes umbilical hernia repair (2008) and craniotomy ().  Family History   Problem Relation Age of Onset    Heart Disease Mother 54        pacemaker,     Hyperlipidemia Mother     Valvular heart disease Mother     Other Father 27        suicide    Hyperlipidemia Sister     Cancer Maternal Uncle         prostate    Other Paternal Uncle         hepatitis    Heart Disease Maternal Grandmother     Heart Attack Maternal Grandmother 45        angioplasty    Lung Cancer Maternal Grandmother 83        tobacco    Cancer Paternal Grandmother     Hyperlipidemia Daughter     Cancer Other         prostate cancer in cousins and dad's uncle    Ovarian Cancer Neg Hx     Tubal Cancer Neg Hx     Peritoneal Cancer Neg Hx     Colorectal Cancer Neg Hx     Breast Cancer Neg Hx      Social History[1]    Patient Active Problem List    Diagnosis Date Noted    Insulin resistance 2024    Coronary artery calcification seen on CT scan 2024    Agatston CAC score, <100 2024    FCHL (familial combined hyperlipidemia) 2024    Hypertriglyceridemia 2024    Overweight (BMI 25.0-29.9) 2024    Prediabetes 2024    Metabolic syndrome 2024    Low testosterone 2024    Impaired fasting blood sugar 2022    Mixed dyslipidemia 2021    Fatigue 2021    Enlarged prostate 2021    Seasonal allergies 2021       Current Medications[2] (including changes today)  Allergies: Patient has no known allergies.    Review of Systems   Constitutional: Negative for fever, chills and malaise/fatigue.   HENT: Negative for congestion.    Eyes: Negative for pain.   Respiratory: Negative for cough and shortness of breath.    Cardiovascular: Negative for leg swelling.   Gastrointestinal: Negative for nausea, vomiting, abdominal pain and diarrhea.   Genitourinary: Negative for dysuria and hematuria.   Skin: Negative for rash.   Neurological: Negative for dizziness, focal  "weakness and headaches.   Endo/Heme/Allergies: Does not bleed easily.   Psychiatric/Behavioral: Negative for depression.  The patient is not nervous/anxious.      Objective:     /58 (BP Location: Right arm, Patient Position: Sitting, BP Cuff Size: Adult)   Pulse (!) 59   Temp 36.7 °C (98 °F) (Temporal)   Ht 1.702 m (5' 7\")   Wt 74.7 kg (164 lb 10.9 oz)   SpO2 97%   BMI 25.79 kg/m²   Body mass index is 25.79 kg/m².  Wt Readings from Last 4 Encounters:   06/04/25 74.7 kg (164 lb 10.9 oz)   05/27/25 74.4 kg (164 lb)   11/25/24 74.8 kg (165 lb)   09/19/24 79.4 kg (175 lb)       Physical Exam:  Constitutional: Well-developed and well-nourished. No acute distress.   Skin: Skin is warm and dry. No rash noted.  Head: Atraumatic without lesions.  Eyes: Conjunctivae and extraocular motions are normal. Pupils are equal, round, and reactive to light. No scleral icterus.   Ears:  External ears unremarkable. Cerumen on the left. Tympanic membrane on the right clear and intact.  Mouth/Throat: Dentition is good. Tongue normal. Oropharynx is clear and moist. Posterior pharynx without erythema or exudates.  Neck: Supple, trachea midline. Normal range of motion. No thyromegaly. No lymphadenopathy.  Cardiovascular: Regular rate and rhythm, S1 and S2 without murmur, rubs, or gallops.    Lungs: Effort normal. Clear to auscultation bilaterally.  Abdomen: Soft, non tender, and without distention. Active bowel sounds.  Extremities: No cyanosis, clubbing, erythema, nor edema.  Musculoskeletal: Normal ROM in all extremities.  Neurological: Alert and oriented x 3.  No acute focal deficits.  Psychiatric:  Behavior, mood, and affect are appropriate.      Picture consent was verbally obtained from the patient.    Labs:  Hospital Outpatient Visit on 05/09/2025   Component Date Value Ref Range Status    Glycohemoglobin 05/09/2025 5.5  4.0 - 5.6 % Final    Comment: Increased risk for diabetes:  5.7 -6.4%  Diabetes:  >6.4%  Glycemic " control for adults with diabetes:  <7.0%    The above interpretations are per ADA guidelines.  Diagnosis  of diabetes mellitus on the basis of elevated Hemoglobin A1c  should be confirmed by repeating the Hb A1c test.      Est Avg Glucose 05/09/2025 111  mg/dL Final    Comment: The eAG calculation is based on the A1c-Derived Daily Glucose  (ADAG) study.  See the ADA's website for additional information.      Cholesterol,Tot 05/09/2025 126  100 - 199 mg/dL Final    Triglycerides 05/09/2025 87  0 - 149 mg/dL Final    HDL 05/09/2025 41  >=40 mg/dL Final    LDL 05/09/2025 68  <100 mg/dL Final    Sodium 05/09/2025 144  135 - 145 mmol/L Final    Potassium 05/09/2025 4.9  3.6 - 5.5 mmol/L Final    Chloride 05/09/2025 110  96 - 112 mmol/L Final    Co2 05/09/2025 27  20 - 33 mmol/L Final    Anion Gap 05/09/2025 7.0  7.0 - 16.0 Final    Glucose 05/09/2025 107 (H)  65 - 99 mg/dL Final    Bun 05/09/2025 22  8 - 22 mg/dL Final    Creatinine 05/09/2025 1.15  0.50 - 1.40 mg/dL Final    Calcium 05/09/2025 9.8  8.5 - 10.5 mg/dL Final    Correct Calcium 05/09/2025 9.4  8.5 - 10.5 mg/dL Final    AST(SGOT) 05/09/2025 27  12 - 45 U/L Final    ALT(SGPT) 05/09/2025 27  2 - 50 U/L Final    Alkaline Phosphatase 05/09/2025 30  30 - 99 U/L Final    Total Bilirubin 05/09/2025 0.5  0.1 - 1.5 mg/dL Final    Albumin 05/09/2025 4.5  3.2 - 4.9 g/dL Final    Total Protein 05/09/2025 6.9  6.0 - 8.2 g/dL Final    Globulin 05/09/2025 2.4  1.9 - 3.5 g/dL Final    A-G Ratio 05/09/2025 1.9  g/dL Final    Apolipoprotein-B 05/09/2025 64 (L)  66 - 133 mg/dL Final    Comment: REFERENCE INTERVAL: Apolipoprotein B  A desirable fasting serum Apo B concentration for the prevention  of atherosclerotic cardiovascular disease in adults is less than  90 mg/dL.  A fasting serum Apo B concentration of 130 mg/dL or  greater corresponds to a LDL cholesterol concentration greater  than 160 mg/dL and constitutes a risk enhancing factor for  atherosclerotic cardiovascular  disease in adults.  Performed By: [facility]  [address] [place] [address]  : Lester Wiley MD, PhD  CLIA Number: [id number]      Fasting Status 05/09/2025 Fasting   Final    GFR (CKD-EPI) 05/09/2025 76  >60 mL/min/1.73 m 2 Final    Comment: Estimated Glomerular Filtration Rate is calculated using  race neutral CKD-EPI 2021 equation per NKF-ASN recommendations.           Assessment and Plan:     1. Wellness examination  Annual preventive exam done today.  Recent labs reviewed.  UTD with colonoscopy.  Prostate cancer screening ordered.  He was advised to get shingrix at the pharmacy.  - CBC WITHOUT DIFFERENTIAL; Future    2. Rash  This is a new problem.  See picture above.  Onset 3-4 months ago.  No improvement with OTC cortisone and aloe vera.  Prescribed triamcinolone cream twice daily.  - triamcinolone acetonide (KENALOG) 0.1 % Cream; Apply 1 Application topically 2 times a day.  Dispense: 45 g; Refill: 0    3. Low testosterone  Chronic, controlled.  July 2024 testosterone 308.  Insurance stopped covering Androgel and he didn't tolerate injections.  Currently using OTC nugenix total T.  Repeat lab ordered.  - Testosterone, Free & Total, Adult Male (w/SHBG); Future    4. FCHL (familial combined hyperlipidemia)  Chronic, controlled.  Lipid panel WNL.  He follows up with cardiology.  Continue rosuvastatin 10 mg daily and fenofibrate 145 mg daily.  - rosuvastatin (CRESTOR) 10 MG Tab; Take 1 Tablet by mouth every day. To lower cholesterol and heart disease risk  Dispense: 90 Tablet; Refill: 3  - fenofibrate (TRICOR) 145 MG Tab; Take 1 Tablet by mouth every day.  Dispense: 90 Tablet; Refill: 3    5. Enlarged prostate  6. Prostate cancer screening  Chronic, stable.  July 2024 PSA WNL and repeat ordered.  He no longer follows up with urology.  He did not tolerate flomax and is currently taking OTC supplement with selenium.  - PROSTATE SPECIFIC AG SCREENING; Future    7.  Impacted cerumen of left ear  This is a new problem.  Advised earwax softener and avoid using Q-tips.    8. Need for vaccination (Primary)  - TDAP VACCINE =>8YO IM  - Pneumococcal Conjugate Vaccine 20-Valent (6 wks+)      Anticipatory guidance  --Discussed moderation in sodium/caffeine intake, saturated fat and cholesterol, caloric balance, sufficient fresh fruits/vegetables.  --Discussed brushing, flossing, and dental visits.   --Encouraged 150 minutes of exercise weekly.   --Discussed tobacco, alcohol, and other drug use.  --Discussed safety belts, safety helmets, smoke detector, gun safety etc.  --Discussed sun protection with minimum of spf 30.      Follow-up: Return in about 1 year (around 6/4/2026) for Annual preventive visit.    Verbal consent was acquired by the patient to use "[employer] ambient listening note generation during this visit: Yes.    Please note that this dictation was created using voice recognition software. I have made every reasonable attempt to correct obvious errors, but I expect that there are errors of grammar and possibly content that I did not discover before finalizing the note.         [1]   Social History  Tobacco Use    Smoking status: Never    Smokeless tobacco: Former     Types: Chew    Tobacco comments:     Chewed age 13 to 41   Vaping Use    Vaping status: Every Day    Substances: Nicotine    Devices: Disposable   Substance Use Topics    Alcohol use: Yes     Comment: occasional    Drug use: No   [2]   Current Outpatient Medications   Medication Sig Dispense Refill    triamcinolone acetonide (KENALOG) 0.1 % Cream Apply 1 Application topically 2 times a day. 45 g 0    rosuvastatin (CRESTOR) 10 MG Tab Take 1 Tablet by mouth every day. To lower cholesterol and heart disease risk 90 Tablet 3    fenofibrate (TRICOR) 145 MG Tab Take 1 Tablet by mouth every day. 90 Tablet 3    albuterol 108 (90 Base) MCG/ACT Aero Soln inhalation aerosol Inhale 2 Puffs every 6 hours as needed for  Shortness of Breath. 8 g 2    NON SPECIFIED Vitamin d  Nugenix total t for low testosterone  Otc supplementat with selenium for BPH  Otc supplement with citrus bergamot for cholesterol       No current facility-administered medications for this visit.

## 2025-07-07 ENCOUNTER — HOSPITAL ENCOUNTER (OUTPATIENT)
Dept: LAB | Facility: MEDICAL CENTER | Age: 55
End: 2025-07-07
Attending: STUDENT IN AN ORGANIZED HEALTH CARE EDUCATION/TRAINING PROGRAM
Payer: COMMERCIAL

## 2025-07-07 DIAGNOSIS — R79.89 LOW TESTOSTERONE: Chronic | ICD-10-CM

## 2025-07-07 DIAGNOSIS — Z00.00 WELLNESS EXAMINATION: ICD-10-CM

## 2025-07-07 DIAGNOSIS — Z12.5 PROSTATE CANCER SCREENING: ICD-10-CM

## 2025-07-07 LAB
ERYTHROCYTE [DISTWIDTH] IN BLOOD BY AUTOMATED COUNT: 42.2 FL (ref 35.9–50)
HCT VFR BLD AUTO: 41.3 % (ref 42–52)
HGB BLD-MCNC: 13.9 G/DL (ref 14–18)
MCH RBC QN AUTO: 31.2 PG (ref 27–33)
MCHC RBC AUTO-ENTMCNC: 33.7 G/DL (ref 32.3–36.5)
MCV RBC AUTO: 92.6 FL (ref 81.4–97.8)
PLATELET # BLD AUTO: 197 K/UL (ref 164–446)
PMV BLD AUTO: 10.8 FL (ref 9–12.9)
PSA SERPL DL<=0.01 NG/ML-MCNC: 1.01 NG/ML (ref 0–4)
RBC # BLD AUTO: 4.46 M/UL (ref 4.7–6.1)
WBC # BLD AUTO: 5.8 K/UL (ref 4.8–10.8)

## 2025-07-07 PROCEDURE — 85027 COMPLETE CBC AUTOMATED: CPT

## 2025-07-07 PROCEDURE — 36415 COLL VENOUS BLD VENIPUNCTURE: CPT

## 2025-07-07 PROCEDURE — 84403 ASSAY OF TOTAL TESTOSTERONE: CPT

## 2025-07-07 PROCEDURE — 84153 ASSAY OF PSA TOTAL: CPT

## 2025-07-07 PROCEDURE — 84270 ASSAY OF SEX HORMONE GLOBUL: CPT

## 2025-07-07 PROCEDURE — 84402 ASSAY OF FREE TESTOSTERONE: CPT

## 2025-07-09 LAB
SHBG SERPL-SCNC: 56 NMOL/L (ref 19–76)
TESTOST FREE MFR SERPL: 1.3 % (ref 1.6–2.9)
TESTOST FREE SERPL-MCNC: 56 PG/ML (ref 47–244)
TESTOST SERPL-MCNC: 416 NG/DL (ref 300–890)